# Patient Record
Sex: FEMALE | Race: WHITE | Employment: PART TIME | URBAN - METROPOLITAN AREA
[De-identification: names, ages, dates, MRNs, and addresses within clinical notes are randomized per-mention and may not be internally consistent; named-entity substitution may affect disease eponyms.]

---
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
## 2021-01-02 ENCOUNTER — OFFICE VISIT - RIVER FALLS (OUTPATIENT)
Dept: FAMILY MEDICINE | Facility: CLINIC | Age: 21
End: 2021-01-02

## 2021-01-02 LAB — DEPRECATED S PYO AG THROAT QL EIA: NOT DETECTED

## 2021-01-08 ENCOUNTER — OFFICE VISIT - RIVER FALLS (OUTPATIENT)
Dept: FAMILY MEDICINE | Facility: CLINIC | Age: 21
End: 2021-01-08

## 2021-01-08 ASSESSMENT — MIFFLIN-ST. JEOR: SCORE: 1469.87

## 2021-01-09 LAB
CHLAMYDIA TRACHOMATIS RNA, TMA - QUEST: NOT DETECTED
NEISSERIA GONORRHOEAE RNA TMA: NOT DETECTED

## 2021-01-15 ENCOUNTER — COMMUNICATION - RIVER FALLS (OUTPATIENT)
Dept: FAMILY MEDICINE | Facility: CLINIC | Age: 21
End: 2021-01-15

## 2021-01-27 ENCOUNTER — COMMUNICATION - RIVER FALLS (OUTPATIENT)
Dept: FAMILY MEDICINE | Facility: CLINIC | Age: 21
End: 2021-01-27

## 2021-02-08 ENCOUNTER — COMMUNICATION - RIVER FALLS (OUTPATIENT)
Dept: FAMILY MEDICINE | Facility: CLINIC | Age: 21
End: 2021-02-08

## 2021-02-11 ENCOUNTER — OFFICE VISIT - RIVER FALLS (OUTPATIENT)
Dept: FAMILY MEDICINE | Facility: CLINIC | Age: 21
End: 2021-02-11

## 2021-02-11 ASSESSMENT — MIFFLIN-ST. JEOR: SCORE: 1447.19

## 2021-02-12 ENCOUNTER — COMMUNICATION - RIVER FALLS (OUTPATIENT)
Dept: FAMILY MEDICINE | Facility: CLINIC | Age: 21
End: 2021-02-12

## 2021-02-12 LAB
6-ACETYL MORPHINE - HISTORICAL: ABNORMAL
6-ACETYL MORPHINE - HISTORICAL: NEGATIVE NG/ML
AMPHETAMINE - HISTORICAL: ABNORMAL
AMPHETAMINES UR QL: NEGATIVE NG/ML
BENZODIAZ UR QL SCN: NEGATIVE NG/ML
BENZODIAZEPINES: ABNORMAL
BUPRENORPHINE - HISTORICAL: ABNORMAL
BUPRENORPHINE: NEGATIVE NG/ML
COCAINE METAB URINE - HISTORICAL: NEGATIVE NG/ML
COCAINE METABOLITE: ABNORMAL
COMMENTS: ABNORMAL
CREAT UR-MCNC: 230.5 MG/DL
ETHYL GLUCURONIDE UR QL CFM: ABNORMAL
ETHYL GLUCURONIDE UR QL CFM: NEGATIVE NG/ML
MDMA: ABNORMAL
MDMA: NEGATIVE NG/ML
OPIATES UR QL SCN: NEGATIVE NG/ML
OPIATES: ABNORMAL
OXIDANTS URINE: NEGATIVE MCG/ML
OXYCODONE URINE - HISTORICAL: NEGATIVE NG/ML
OXYCODONE: ABNORMAL
PH UR STRIP: 5.7 [PH] (ref 4.5–9)
PRESCRIBED DRUG 1: ABNORMAL
THC 50 URINE - HISTORICAL: NEGATIVE NG/ML
THC METABOLITE: ABNORMAL

## 2021-08-05 ENCOUNTER — OFFICE VISIT - RIVER FALLS (OUTPATIENT)
Dept: FAMILY MEDICINE | Facility: CLINIC | Age: 21
End: 2021-08-05

## 2021-08-23 ENCOUNTER — VIRTUAL VISIT (OUTPATIENT)
Dept: BEHAVIORAL HEALTH | Facility: TELEHEALTH | Age: 21
End: 2021-08-23
Payer: MEDICAID

## 2021-08-23 DIAGNOSIS — F43.23 ADJUSTMENT DISORDER WITH MIXED ANXIETY AND DEPRESSED MOOD: Primary | ICD-10-CM

## 2021-08-23 PROCEDURE — 90832 PSYTX W PT 30 MINUTES: CPT | Mod: 95 | Performed by: SOCIAL WORKER

## 2021-08-26 NOTE — PROGRESS NOTES
[facility] Primary [facility]: : Integrated Behavioral Health  August 23, 2021    Telemedicine Visit: The patient's condition can be safely assessed and treated via synchronous audio and visual telemedicine encounter.      Reason for Telemedicine Visit: Services only offered telehealth    Originating Site (Patient Location): Patient's home    Distant Site (Provider Location): Provider Remote Setting- Home Office    Consent:  The patient/guardian has verbally consented to: the potential risks and benefits of telemedicine (video visit) versus in person care; bill my insurance or make self-payment for services provided; and responsibility for payment of non-covered services.     Mode of Communication:  Video Conference via Visual Networks    As the provider I attest to compliance with applicable laws and regulations related to telemedicine.      Behavioral Health Clinician Progress Note    Patient Name: Charlotte Russell           Service Type:  Individual      Service Location:   Face to Face in Home / Community via KSKT     Session Start Time: 12:35pm  Session End Time: 12:58pm      Session Length: 16 - 37      Attendees: Patient    Visit Activities (Refresh list every visit): [facility] Only    Diagnostic Assessment Date: Next Session  Treatment Plan Review Date: NA  See Flowsheets for today's PHQ-9 and AI-7 results  Previous PHQ-9: No flowsheet data found.  Previous AI-7: No flowsheet data found.    YARITZA LEVEL:  No flowsheet data found.    DATA  Extended Session (60+ minutes): No  Interactive Complexity: No  Crisis: No  [facility] Patient: No    Treatment Objective(s) Addressed in This Session:  Target Behavior(s): disease management/lifestyle changes related to mental health    Depressed Mood: Increase interest, engagement, and pleasure in doing things  Decrease frequency and intensity of feeling down, depressed, hopeless  Improve quantity and quality of night time sleep / decrease daytime naps  Feel  less tired and more energy during the day   Improve diet, appetite, mindful eating, and / or meal planning  Identify negative self-talk and behaviors: challenge core beliefs, myths, and actions  Improve concentration, focus, and mindfulness in daily activities   Feel less fidgety, restless or slow in daily activities / interpersonal interactions  Anxiety: will experience a reduction in anxiety, will develop more effective coping skills to manage anxiety symptoms, will develop healthy cognitive patterns and beliefs and will increase ability to function adaptively  PTSD Symptom Management    Current Stressors / Issues:  [facility] [place] introduced self and role to patient. Patient reported that she has done counseling before and done testing. Patient reported that she has been diagnosed with PTSD before, but has been struggling recently with managing stress. Patient reported that she is struggling with sleep, having a lack of energy, and irritability. She reported that her PTSD symptoms have not been too bad and that she feels like it has been managed well. One of the challenges for patient is that she does not have family support to help manage the stressors in her life. Patient denied SI or thoughts of self-harm.      Progress on Treatment Objective(s) / Homework:  New Objective established this session - CONTEMPLATION (Considering change and yet undecided); Intervened by assessing the negative and positive thinking (ambivalence) about behavior change    Motivational Interviewing    MI Intervention: Expressed Empathy/Understanding, Supported Autonomy, Collaboration, Evocation, Permission to raise concern or advise, Open-ended questions and Reflections: simple and complex     Change Talk Expressed by the Patient: Desire to change Ability to change Reasons to change Need to change    Provider Response to Change Talk: E - Evoked more info from patient about behavior change, A - Affirmed patient's thoughts, decisions, or attempts at  behavior change, R - Reflected patient's change talk and S - Summarized patient's change talk statements      Situation        Automatic Thoughts  Cognitive Distortions      Feelings        Behavior        Questioning Thoughts            Also provided psychoeducation about behavioral health condition, symptoms, and treatment options    Care Plan review completed: Yes    Medication Review:  No current psychiatric medications prescribed    Medication Compliance:  NA    Changes in Health Issues:   None reported    Chemical Use Review:   Substance Use: Chemical use reviewed, no active concerns identified      Tobacco Use: No current tobacco use.      Assessment: Current Emotional / Mental Status (status of significant symptoms):  Risk status (Self / Other harm or suicidal ideation)  Patient denies a history of suicidal ideation, suicide attempts, self-injurious behavior, homicidal ideation, homicidal behavior and and other safety concerns  Patient denies current fears or concerns for personal safety.  Patient denies current or recent suicidal ideation or behaviors.  Patient denies current or recent homicidal ideation or behaviors.  Patient denies current or recent self injurious behavior or ideation.  Patient denies other safety concerns.  A safety and risk management plan has not been developed at this time, however patient was encouraged to call [place] [phone] should there be a change in any of these risk factors.    Appearance:   Appropriate   Eye Contact:   Good   Psychomotor Behavior: Normal   Attitude:   Cooperative   Orientation:   All  Speech   Rate / Production: Normal    Volume:  Normal   Mood:    Normal  Affect:    Appropriate   Thought Content:  Clear   Thought Form:  Coherent  Logical   Insight:    Good     Diagnoses:  1. Adjustment disorder with mixed anxiety and depressed mood        Collateral Reports Completed:  Routed note to PCP    Plan: (Homework, other):  Patient was given information about  behavioral services and encouraged to schedule a follow up appointment with the clinic [facility] as needed.  She was also given information about mental health symptoms and treatment options .  CD Recommendations: No indications of CD issues. DA to be completed at next session. DWAYNE Bosch, [facility]      ______________________________________________________________________

## 2021-10-05 ENCOUNTER — OFFICE VISIT - RIVER FALLS (OUTPATIENT)
Dept: FAMILY MEDICINE | Facility: CLINIC | Age: 21
End: 2021-10-05

## 2021-10-07 LAB — BACTERIA SPEC CULT: NORMAL

## 2021-10-16 ENCOUNTER — HEALTH MAINTENANCE LETTER (OUTPATIENT)
Age: 21
End: 2021-10-16

## 2021-10-22 LAB — DEPRECATED S PYO AG THROAT QL EIA: NEGATIVE

## 2021-11-15 ENCOUNTER — OFFICE VISIT - RIVER FALLS (OUTPATIENT)
Dept: FAMILY MEDICINE | Facility: CLINIC | Age: 21
End: 2021-11-15

## 2021-11-15 PROCEDURE — U0005 INFEC AGEN DETEC AMPLI PROBE: HCPCS | Mod: ORL | Performed by: FAMILY MEDICINE

## 2021-11-15 ASSESSMENT — MIFFLIN-ST. JEOR: SCORE: 1453.09

## 2021-11-16 ENCOUNTER — LAB REQUISITION (OUTPATIENT)
Dept: LAB | Facility: CLINIC | Age: 21
End: 2021-11-16
Payer: MEDICAID

## 2021-11-16 DIAGNOSIS — U07.1 COVID-19: ICD-10-CM

## 2021-11-17 ENCOUNTER — COMMUNICATION - RIVER FALLS (OUTPATIENT)
Dept: FAMILY MEDICINE | Facility: CLINIC | Age: 21
End: 2021-11-17

## 2021-11-17 LAB
BACTERIA SPEC CULT: NORMAL
SARS-COV-2 RNA RESP QL NAA+PROBE: NORMAL
SARS-COV-2 RNA RESP QL NAA+PROBE: NOT DETECTED

## 2021-11-18 LAB — SARS-COV-2 RNA RESP QL NAA+PROBE: NEGATIVE

## 2021-12-06 ENCOUNTER — OFFICE VISIT - RIVER FALLS (OUTPATIENT)
Dept: FAMILY MEDICINE | Facility: CLINIC | Age: 21
End: 2021-12-06

## 2021-12-06 ASSESSMENT — MIFFLIN-ST. JEOR: SCORE: 1471.68

## 2021-12-07 LAB
CHLAMYDIA TRACHOMATIS RNA, TMA - QUEST: NOT DETECTED
HCV AB SERPL QL IA: NORMAL
HEP C SIGNAL TO CUTOFF(HISTORICAL): 0.02
HIV AG/AB  - NOTE: NORMAL
NEISSERIA GONORRHOEAE RNA TMA: NOT DETECTED
RPR: NORMAL

## 2021-12-08 ENCOUNTER — COMMUNICATION - RIVER FALLS (OUTPATIENT)
Dept: FAMILY MEDICINE | Facility: CLINIC | Age: 21
End: 2021-12-08

## 2022-01-08 LAB — DEPRECATED S PYO AG THROAT QL EIA: NEGATIVE

## 2022-01-25 ENCOUNTER — OFFICE VISIT - RIVER FALLS (OUTPATIENT)
Dept: FAMILY MEDICINE | Facility: CLINIC | Age: 22
End: 2022-01-25

## 2022-02-11 VITALS
SYSTOLIC BLOOD PRESSURE: 106 MMHG | TEMPERATURE: 98.9 F | OXYGEN SATURATION: 99 % | HEART RATE: 74 BPM | WEIGHT: 135 LBS | DIASTOLIC BLOOD PRESSURE: 70 MMHG

## 2022-02-11 VITALS
TEMPERATURE: 99.3 F | SYSTOLIC BLOOD PRESSURE: 110 MMHG | HEART RATE: 84 BPM | DIASTOLIC BLOOD PRESSURE: 78 MMHG | OXYGEN SATURATION: 99 % | WEIGHT: 163.4 LBS

## 2022-02-12 VITALS
HEIGHT: 64 IN | HEART RATE: 64 BPM | SYSTOLIC BLOOD PRESSURE: 114 MMHG | TEMPERATURE: 97.7 F | WEIGHT: 167 LBS | DIASTOLIC BLOOD PRESSURE: 70 MMHG | DIASTOLIC BLOOD PRESSURE: 64 MMHG | BODY MASS INDEX: 28.51 KG/M2 | RESPIRATION RATE: 16 BRPM | SYSTOLIC BLOOD PRESSURE: 116 MMHG | WEIGHT: 167 LBS | HEIGHT: 64 IN | HEART RATE: 61 BPM | BODY MASS INDEX: 28.51 KG/M2

## 2022-02-12 VITALS
HEART RATE: 73 BPM | DIASTOLIC BLOOD PRESSURE: 80 MMHG | BODY MASS INDEX: 26.71 KG/M2 | TEMPERATURE: 97.8 F | WEIGHT: 148.4 LBS | SYSTOLIC BLOOD PRESSURE: 120 MMHG | WEIGHT: 156.6 LBS | HEART RATE: 88 BPM | BODY MASS INDEX: 26.88 KG/M2 | DIASTOLIC BLOOD PRESSURE: 76 MMHG | DIASTOLIC BLOOD PRESSURE: 70 MMHG | OXYGEN SATURATION: 98 % | SYSTOLIC BLOOD PRESSURE: 118 MMHG | WEIGHT: 155.6 LBS | TEMPERATURE: 98.1 F | WEIGHT: 152 LBS | TEMPERATURE: 97.6 F | HEART RATE: 88 BPM | SYSTOLIC BLOOD PRESSURE: 106 MMHG | RESPIRATION RATE: 16 BRPM | SYSTOLIC BLOOD PRESSURE: 118 MMHG | HEART RATE: 82 BPM | OXYGEN SATURATION: 99 % | BODY MASS INDEX: 25.95 KG/M2 | HEIGHT: 64 IN | BODY MASS INDEX: 25.47 KG/M2 | TEMPERATURE: 97.8 F | DIASTOLIC BLOOD PRESSURE: 76 MMHG | OXYGEN SATURATION: 99 %

## 2022-02-12 VITALS
SYSTOLIC BLOOD PRESSURE: 128 MMHG | OXYGEN SATURATION: 99 % | TEMPERATURE: 97.9 F | HEART RATE: 76 BPM | SYSTOLIC BLOOD PRESSURE: 110 MMHG | DIASTOLIC BLOOD PRESSURE: 62 MMHG | TEMPERATURE: 97.9 F | BODY MASS INDEX: 26.98 KG/M2 | HEIGHT: 64 IN | HEART RATE: 60 BPM | WEIGHT: 158 LBS | BODY MASS INDEX: 26.27 KG/M2 | WEIGHT: 153.9 LBS | DIASTOLIC BLOOD PRESSURE: 70 MMHG | HEIGHT: 64 IN

## 2022-02-12 VITALS
DIASTOLIC BLOOD PRESSURE: 72 MMHG | WEIGHT: 157.6 LBS | SYSTOLIC BLOOD PRESSURE: 120 MMHG | HEIGHT: 64 IN | TEMPERATURE: 97.8 F | OXYGEN SATURATION: 98 % | BODY MASS INDEX: 26.91 KG/M2 | DIASTOLIC BLOOD PRESSURE: 79 MMHG | HEART RATE: 102 BPM | DIASTOLIC BLOOD PRESSURE: 76 MMHG | BODY MASS INDEX: 26.05 KG/M2 | TEMPERATURE: 98.3 F | SYSTOLIC BLOOD PRESSURE: 120 MMHG | TEMPERATURE: 97.4 F | HEART RATE: 88 BPM | WEIGHT: 152.6 LBS | SYSTOLIC BLOOD PRESSURE: 122 MMHG | OXYGEN SATURATION: 98 % | HEART RATE: 97 BPM | HEIGHT: 64 IN

## 2022-02-12 VITALS
HEART RATE: 71 BPM | OXYGEN SATURATION: 98 % | DIASTOLIC BLOOD PRESSURE: 80 MMHG | RESPIRATION RATE: 16 BRPM | SYSTOLIC BLOOD PRESSURE: 100 MMHG | WEIGHT: 162 LBS | BODY MASS INDEX: 27.81 KG/M2

## 2022-02-12 VITALS
WEIGHT: 152 LBS | HEIGHT: 64 IN | HEART RATE: 64 BPM | RESPIRATION RATE: 16 BRPM | BODY MASS INDEX: 25.95 KG/M2 | SYSTOLIC BLOOD PRESSURE: 114 MMHG | DIASTOLIC BLOOD PRESSURE: 70 MMHG | TEMPERATURE: 98.9 F

## 2022-02-12 VITALS
SYSTOLIC BLOOD PRESSURE: 127 MMHG | HEART RATE: 88 BPM | HEIGHT: 64 IN | HEART RATE: 84 BPM | SYSTOLIC BLOOD PRESSURE: 108 MMHG | DIASTOLIC BLOOD PRESSURE: 84 MMHG | OXYGEN SATURATION: 99 % | SYSTOLIC BLOOD PRESSURE: 106 MMHG | BODY MASS INDEX: 27.19 KG/M2 | HEART RATE: 103 BPM | OXYGEN SATURATION: 98 % | WEIGHT: 150 LBS | DIASTOLIC BLOOD PRESSURE: 72 MMHG | HEART RATE: 88 BPM | WEIGHT: 158.4 LBS | DIASTOLIC BLOOD PRESSURE: 73 MMHG | HEART RATE: 72 BPM | BODY MASS INDEX: 26.16 KG/M2 | BODY MASS INDEX: 25.44 KG/M2 | TEMPERATURE: 98.1 F | TEMPERATURE: 98 F | WEIGHT: 161.4 LBS | BODY MASS INDEX: 25.75 KG/M2 | DIASTOLIC BLOOD PRESSURE: 70 MMHG | OXYGEN SATURATION: 98 % | BODY MASS INDEX: 27.55 KG/M2 | BODY MASS INDEX: 27.33 KG/M2 | TEMPERATURE: 97.4 F | WEIGHT: 152.4 LBS | SYSTOLIC BLOOD PRESSURE: 110 MMHG | DIASTOLIC BLOOD PRESSURE: 68 MMHG | HEART RATE: 65 BPM | OXYGEN SATURATION: 99 % | SYSTOLIC BLOOD PRESSURE: 128 MMHG | TEMPERATURE: 97 F | WEIGHT: 159.2 LBS | DIASTOLIC BLOOD PRESSURE: 78 MMHG | SYSTOLIC BLOOD PRESSURE: 112 MMHG | TEMPERATURE: 98.5 F | WEIGHT: 148.2 LBS

## 2022-02-12 VITALS
TEMPERATURE: 98.5 F | DIASTOLIC BLOOD PRESSURE: 77 MMHG | WEIGHT: 168 LBS | SYSTOLIC BLOOD PRESSURE: 120 MMHG | HEART RATE: 82 BPM

## 2022-02-12 VITALS
SYSTOLIC BLOOD PRESSURE: 112 MMHG | BODY MASS INDEX: 24.99 KG/M2 | HEART RATE: 76 BPM | DIASTOLIC BLOOD PRESSURE: 66 MMHG | TEMPERATURE: 97.7 F | HEART RATE: 92 BPM | WEIGHT: 145.6 LBS | SYSTOLIC BLOOD PRESSURE: 118 MMHG | WEIGHT: 141.6 LBS | DIASTOLIC BLOOD PRESSURE: 78 MMHG | BODY MASS INDEX: 24.31 KG/M2 | TEMPERATURE: 96.4 F | OXYGEN SATURATION: 98 % | OXYGEN SATURATION: 99 %

## 2022-02-12 VITALS
DIASTOLIC BLOOD PRESSURE: 72 MMHG | WEIGHT: 137 LBS | DIASTOLIC BLOOD PRESSURE: 70 MMHG | WEIGHT: 137.4 LBS | WEIGHT: 140.6 LBS | HEART RATE: 103 BPM | DIASTOLIC BLOOD PRESSURE: 64 MMHG | BODY MASS INDEX: 23.58 KG/M2 | SYSTOLIC BLOOD PRESSURE: 114 MMHG | HEART RATE: 84 BPM | BODY MASS INDEX: 24.01 KG/M2 | HEIGHT: 64 IN | SYSTOLIC BLOOD PRESSURE: 120 MMHG | HEART RATE: 76 BPM | TEMPERATURE: 97.9 F | TEMPERATURE: 98.5 F | OXYGEN SATURATION: 98 % | BODY MASS INDEX: 23.52 KG/M2 | SYSTOLIC BLOOD PRESSURE: 112 MMHG | TEMPERATURE: 97.5 F

## 2022-02-12 VITALS
HEIGHT: 64 IN | DIASTOLIC BLOOD PRESSURE: 80 MMHG | BODY MASS INDEX: 27.76 KG/M2 | TEMPERATURE: 98.7 F | WEIGHT: 162.6 LBS | SYSTOLIC BLOOD PRESSURE: 118 MMHG | HEART RATE: 92 BPM

## 2022-02-12 VITALS
TEMPERATURE: 98.5 F | DIASTOLIC BLOOD PRESSURE: 79 MMHG | HEART RATE: 91 BPM | SYSTOLIC BLOOD PRESSURE: 119 MMHG | BODY MASS INDEX: 26.61 KG/M2 | WEIGHT: 155 LBS

## 2022-02-12 VITALS
DIASTOLIC BLOOD PRESSURE: 70 MMHG | HEART RATE: 88 BPM | SYSTOLIC BLOOD PRESSURE: 118 MMHG | WEIGHT: 137 LBS | BODY MASS INDEX: 23.89 KG/M2 | TEMPERATURE: 97.4 F

## 2022-02-15 NOTE — TELEPHONE ENCOUNTER
---------------------  From: Jazmin Vargas MD   To: SENG SCHRADER    Sent: 1/15/2021 1:02:30 PM CST  Subject: General Message       this is normal!    Results:  Date Result Name Value Ref Range   1/8/2021 10:12 AM Chlam/N. gonorrhea Comments See comment    1/8/2021 10:12 AM Chlamydia RNA NOT DETECTED (NOT DETECTED - )   1/8/2021 10:12 AM Neisseria gonorrhoeae RNA NOT DETECTED (NOT DETECTED - )

## 2022-02-15 NOTE — PROGRESS NOTES
Patient:   SENG SCHRADER            MRN: [id number]            FIN: [id number]               Age:   19 years     Sex:  Female     :  2000   Associated Diagnoses:   Cystitis; Possible urinary tract infection   Author:   Fina Verma MD      Chief Complaint   8/3/2019 12:22 PM CDT    Patient is here for UTI. c/o burning with urination, back pain. Started about a week ago.        History of Present Illness   patient with one week of UTI symptoms - urgency, frequency, dysuria  feeling flu like  new onset of bilateral flank pain since last night, uncomfortable, kept her up at night  no known fevers  no blood in urine         Health Status   Allergies:    Allergic Reactions (All)  No known allergies  No Known Medication Allergies   Medications:  (Selected)   Prescriptions  Prescribed  Adderall XR 25 mg oral capsule, extended release: = 1 cap(s) ( 25 mg ), PO, qAM, # 30 cap(s), 0 Refill(s), Type: Maintenance  Adderall XR 25 mg oral capsule, extended release: = 1 cap(s) ( 25 mg ), PO, qAM, # 30 cap(s), 0 Refill(s), Type: Maintenance, Pharmacy: [id number], 1 cap(s) Oral qam  Adderall XR 25 mg oral capsule, extended release: = 1 cap(s) ( 25 mg ), PO, qAM, # 30 cap(s), 0 Refill(s), Type: Maintenance, Pharmacy: [id number], 1 cap(s) Oral qam  Loestrin 21  oral tablet: 1 tab(s), PO, Daily, # 84 tab(s), 3 Refill(s), Type: Maintenance, Pharmacy: [id number], 1 tab(s) Oral daily   Problem list:    All Problems (Selected)  ADD (attention deficit disorder) / SNOMED CT 5875085642 / Confirmed  Concussion / SNOMED CT 1074883378 / Confirmed  Needle phobia / SNOMED CT 909242236 / Confirmed  Syncope / SNOMED CT 199500942 / Confirmed      Histories   Past Medical History:    Active  ADD (attention deficit disorder) (SNOMED CT 2620531470)  Comments:  2011 CDT 11:51 AM CDT - Lorena Hood  'Possible'  Resolved  Viral pharyngitis (SNOMED CT 5103286):  Resolved.   Family History:     Gastro-esophageal reflux disease  Mother ()  Suicide..  Mother ()     Procedure history:    No previous procedures.      Physical Examination   Vital Signs   8/3/2019 12:22 PM CDT Temperature Tympanic 97.4 DegF  LOW    Peripheral Pulse Rate 88 bpm    HR Method Electronic    Systolic Blood Pressure 118 mmHg    Diastolic Blood Pressure 70 mmHg    Mean Arterial Pressure 86 mmHg    BP Site Right arm    BP Method Manual      Measurements from flowsheet : Measurements   8/3/2019 12:22 PM CDT    Weight Measured - Standard                137 lb     General:  Alert and oriented, No acute distress.    Eye:  Normal conjunctiva.    HENT:  Oral mucosa is moist.    Respiratory:  Lungs are clear to auscultation, Respirations are non-labored.    Cardiovascular:  Normal rate, Regular rhythm.    Genitourinary:  right CVA tenderness.       Review / Management   Results review:  Lab results   8/3/2019 12:10 PM CDT UA WBC Clumps Present    UA Epithelial Cells Moderate    UA WBC     UA RBC 11-25    UA Bacteria Many   8/3/2019 11:57 AM CDT UA pH 7.0    UA Specific Gravity 1.015    UA Glucose NEGATIVE    UA Bilirubin NEGATIVE    UA Ketones NEGATIVE    Urine Occult Blood 3+    UA Protein 2+    UA Nitrite NEGATIVE    UA Leukocyte Esterase 2+   .       Impression and Plan   Diagnosis     Cystitis (PQJ74-OB N30.90).     Possible urinary tract infection (LDZ10-MZ R39.89).     Plan:  UTI symptoms, concerned for early pyelo, patient declines urine culture testing due to concerns about cost but will  antibiotics today and understands that if she is worsening, or if she isn't improving over the next 48-72 hours, she needs to follow up in clinic for further testing..    Orders     Orders (Selected)   Prescriptions  Prescribed  Cipro 500 mg oral tablet: = 1 tab(s) ( 500 mg ), Oral, q12 hrs, x 10 day(s), # 20 tab(s), 0 Refill(s), Type: Acute, Pharmacy: [id number], 1 tab(s) Oral q12 hrs,x10 day(s).

## 2022-02-15 NOTE — NURSING NOTE
Comprehensive Intake Entered On:  1/8/2021 9:12 AM CST    Performed On:  1/8/2021 9:05 AM CST by Chloé Zapata CMA               Summary   Chief Complaint :   Medication check, and requesting referral to GYN   Last Menstrual Period :   12/13/2020 CST   Menstrual Status :   Menarcheal   Weight Measured :   157.6 lb(Converted to: 157 lb 10 oz, 71.486 kg)    Height Measured :   64 in(Converted to: 5 ft 4 in, 162.56 cm)    Body Mass Index :   27.05 kg/m2   Body Surface Area :   1.79 m2   Height/Length Estimated :   64 in(Converted to: 5 ft 4 in, 162.56 cm)    Systolic Blood Pressure :   120 mmHg   Diastolic Blood Pressure :   72 mmHg   Mean Arterial Pressure :   88 mmHg   Peripheral Pulse Rate :   88 bpm   BP Site :   Right arm   Pulse Site :   Radial artery   BP Method :   Manual   HR Method :   Manual   Temperature Tympanic :   97.8 DegF(Converted to: 36.6 DegC)  (LOW)    Chloé Zapata CMA - 1/8/2021 9:05 AM CST   Health Status   Allergies Verified? :   Yes   Medication History Verified? :   Yes   Medical History Verified? :   No   Pre-Visit Planning Status :   Completed   Tobacco Use? :   Never smoker   Chloé Zapata CMA - 1/8/2021 9:05 AM CST   Consents   Consent for Immunization Exchange :   Consent Granted   Consent for Immunizations to Providers :   Consent Granted   Chloé Zapata CMA - 1/8/2021 9:05 AM CST   Meds / Allergies   (As Of: 1/8/2021 9:12:25 AM CST)   Allergies (Active)   Vyvanse  Estimated Onset Date:   Unspecified ; Reactions:   Irritability ; Created By:   Jazmin Vargas MD; Reaction Status:   Active ; Category:   Drug ; Substance:   Vyvanse ; Type:   Side Effect ; Severity:   Moderate ; Updated By:   Jazmin Vargas MD; Reviewed Date:   1/8/2021 9:08 AM CST        Medication List   (As Of: 1/8/2021 9:12:25 AM CST)   Prescription/Discharge Order    ethinyl estradiol-norethindrone  :   ethinyl estradiol-norethindrone ; Status:   Prescribed ; Ordered As Mnemonic:   ethinyl  estradiol-norethindrone 20 mcg-1 mg oral tablet ; Simple Display Line:   1 tab(s), Oral, daily, 84 tab(s), 0 Refill(s) ; Ordering Provider:   Jazmin Vargas MD; Catalog Code:   ethinyl estradiol-norethindrone ; Order Dt/Tm:   9/21/2020 10:57:52 AM CDT          methylphenidate  :   methylphenidate ; Status:   Prescribed ; Ordered As Mnemonic:   methylphenidate 10 mg oral tablet ; Simple Display Line:   15 mg, 1.5 tab(s), Oral, bid, 90 tab(s), 0 Refill(s) ; Ordering Provider:   Jazmin Vargas MD; Catalog Code:   methylphenidate ; Order Dt/Tm:   9/11/2020 6:19:54 PM CDT          methylphenidate  :   methylphenidate ; Status:   Completed ; Ordered As Mnemonic:   methylphenidate 10 mg oral tablet ; Simple Display Line:   15 mg, 1.5 tab(s), Oral, bid, 90 tab(s), 0 Refill(s) ; Ordering Provider:   Jazmin Vargas MD; Catalog Code:   methylphenidate ; Order Dt/Tm:   3/24/2020 5:30:34 PM CDT            ID Risk Screen   Recent Travel History :   No recent travel   Family Member Travel History :   No recent travel   Other Exposure to Infectious Disease :   Unknown   Chloé Zapata CMA - 1/8/2021 9:05 AM CST   Social History   Social History   (As Of: 1/8/2021 9:12:26 AM CST)   Alcohol:        Never   (Last Updated: 9/5/2018 8:33:10 AM CDT by Nadiya Sinclair)          Tobacco:        Never, Household tobacco concerns: No.   (Last Updated: 5/13/2011 11:48:42 AM CDT by Lorena Hood)   Never (less than 100 in lifetime)   (Last Updated: 12/15/2020 3:18:12 PM CST by Joaquina Gallego CMA)          Electronic Cigarette/Vaping:        Electronic Cigarette Use: Never.   (Last Updated: 12/15/2020 3:18:12 PM CST by Joaquina Gallego CMA)          Substance Abuse:        Never   (Last Updated: 8/11/2015 2:20:36 PM CDT by Henry Florian CMA)          Home/Environment:        Lives with Father, Stepmother.  Substance abuse in household: No.  Smoker in household: No.  Risks in environment: Does not wear helmet.   (Last Updated:  8/11/2015 2:25:12 PM CDT by Henry Florian CMA)          Sexual:        Sexually active: No.   (Last Updated: 8/11/2015 2:21:05 PM CDT by Henry Florian CMA)

## 2022-02-15 NOTE — PROGRESS NOTES
Patient:   SENG SCHRADER            MRN: [id number]            FIN: [id number]               Age:   17 years     Sex:  Female     :  2000   Associated Diagnoses:   Infected pierced ear   Author:   Steven Katz PA-C      Chief Complaint   2018 3:18 PM CDT    Pt here for infected Right ear piercing.  Pt states right ear lob is swollen and painful to touch x 1 day      History of Present Illness   Chief complaint and symptoms noted above and confirmed with patient   as above  she has had her ears pierced for about 8 years, not wearing new earrings  but yesterday right ear lobe became swollen and painful, no drainage  hard to pull earing out last night      Review of Systems   Constitutional:  Negative.    Ear/Nose/Mouth/Throat:  No sore throat     Ear pain: Right.    Respiratory:  Negative.       Health Status   Allergies:    Allergic Reactions (Selected)  No known allergies   Medications:  (Selected)   Prescriptions  Prescribed  Loestrin 21  oral tablet: 1 tab(s), PO, Daily, # 84 tab(s), 3 Refill(s), Type: Maintenance, Pharmacy: [id number], 1 tab(s) po daily  erythromycin 2% topical gel: See Instructions, Instructions: APPLY TWICE DAILY, # 30 gm, 1 Refill(s), Pharmacy: [id number], APPLY TWICE DAILY   Problem list:    All Problems (Selected)  ADD (Attention Deficit Disorder) / ICD-9-.00 / Confirmed      Histories   Past Medical History:    Active  ADD (Attention Deficit Disorder) (314.00)  Comments:  2011 CDT 11:51 AM CDT - Lorena Hood  'Possible'  Resolved  Viral pharyngitis (462):  Resolved.   Family History:    Gastro-esophageal reflux disease  Mother ()  Suicide..  Mother ()     Procedure history:    No previous procedures.      Physical Examination   Vital Signs   2018 3:18 PM CDT Temperature Tympanic 98.9 DegF    Peripheral Pulse Rate 64 bpm    Pulse Site Radial artery    Respiratory Rate 16 br/min    Systolic Blood Pressure 114  mmHg    Diastolic Blood Pressure 70 mmHg    Mean Arterial Pressure 85 mmHg    BP Site Right arm      Measurements from flowsheet : Measurements   5/31/2018 3:18 PM CDT Height Measured - Standard 63.5 in    Weight Measured - Standard 152 lb    BSA 1.76 m2    Body Mass Index 26.5 kg/m2    Body Mass Index Percentile 89.54      General:  No acute distress.    HENT:  Tympanic membranes are clear, No pharyngeal erythema, No sinus tenderness, right lower earlobe is inflamed and swollen, slight crusting over lower piercing, no drainage.    Neck:  Supple, Non-tender, No lymphadenopathy.       Impression and Plan   Diagnosis     Infected pierced ear (PSJ42-QX S01.339A).     Summary:  will treat with Bactrim DS for 7 days, continue to wear earrings, apply neosporin to earring and work it through the piercing, follow up if not improving  I called her father and discussed treatment plan with him.    Orders     Orders   Pharmacy:  Bactrim  mg-160 mg oral tablet (Prescribe): 1 tab(s), PO, BID, x 7 day(s), # 14 tab(s), 0 Refill(s), Type: Maintenance, Pharmacy: [id number], 1 tab(s) po bid,x7 day(s).     Orders   Charges (Evaluation and Management):  14359 office outpatient visit 15 minutes (Charge) (Order): Quantity: 1, Infected pierced ear.     Orders   Charges (Evaluation and Management):  79915 office outpatient visit 15 minutes (Charge) (Order): Quantity: 1, Infected pierced ear.

## 2022-02-15 NOTE — LETTER
(Inserted Image. Unable to display)       May 18, 2019      SENG MACRINA  [id number], [place] [id number]        Dear SENG,     Thank you for selecting [facility] for your healthcare needs. Below you will find the results of your recent test(s) done at our clinic.      Your results are normal!      Result Name Current Result Reference Range   U pH  6.8 5/15/2019 4.5 - 9.0   U Creatinine (mg/dL)  131.4 5/15/2019 > or = 20.0 -    Pain Management Prescribed Drug 1  Adderall(TM) 5/15/2019    U Alcohol Metab medMATCH  CONSISTENT 5/15/2019    U Alcohol Metabolities (ng/mL)  NEGATIVE 5/15/2019  - <500   U Amphetamines (ng/mL)  NEGATIVE 5/15/2019  - <500   U Amphetamines medMATCH ((A)) INCONSISTENT 5/15/2019    U Benzodia Scrn (ng/mL)  NEGATIVE 5/15/2019  - <100   U Benzodia medMATCH  CONSISTENT 5/15/2019    U Buprenorphine (ng/mL)  NEGATIVE 5/15/2019  - <5   U Cannabis Metabolite medMATCH  CONSISTENT 5/15/2019    U Cannabis Metabolite Scrn (ng/mL)  NEGATIVE 5/15/2019  - <20   U Cocaine Metabolite Scrn (ng/mL)  NEGATIVE 5/15/2019  - <150   U Cocaine Metabolite medMATCH  CONSISTENT 5/15/2019    U MDMA (ng/mL)  NEGATIVE 5/15/2019  - <500   U medMATCH Comments  See comment 5/15/2019    U 6-Acetylmorphine (ng/mL)  NEGATIVE 5/15/2019  - <10   U 6 Acetylmorphine medMATCH  CONSISTENT 5/15/2019    U Opiates Scrn (ng/mL)  NEGATIVE 5/15/2019  - <100   U Opiates medMATCH  CONSISTENT 5/15/2019    U Oxidants (mcg/mL)  NEGATIVE 5/15/2019  - <200   U Oxycodone Scrn (ng/mL)  NEGATIVE 5/15/2019  - <100   U Oxycodone Scrn medMATCH  CONSISTENT 5/15/2019    U Buprenorphine medMATCH  CONSISTENT 5/15/2019    U MDMA medMATCH  CONSISTENT 5/15/2019      Please contact my practice at [phone] if you have any questions or concerns.     Sincerely,        Jazmin Vargas MD      What do your labs mean?  Below is a glossary of commonly ordered labs:  LDL   Bad Cholesterol   HDL   Good Cholesterol  AST/ALT   Liver  Function   Cr/Creatinine   Kidney Function  Microalbumin   Kidney Function  BUN   Kidney Function  PSA   Prostate    TSH   Thyroid Hormone  HgbA1c   Diabetes Test   Hgb (Hemoglobin)   Red Blood Cells

## 2022-02-15 NOTE — TELEPHONE ENCOUNTER
Entered by Delgado Ng LPN on February 08, 2021 9:32:22 AM CST  Controlled Substance Agreement Signed 09-13-19  Previous med was Adderall XR 25mg 1 CAPSULE DAILY, last prescribed on 1-8-21.  Routing to provider for review.    ---------------------  From: SENG SCHRADER  To: [facility]  Sent: 02/07/2021 10:30 p.m. CST  Subject: Back to the medication before vyvanse  I am looking to be prescribed the medication I was taking before my latest vyvanse. I had an absolute horrible week last week on vyvanse and actually caused my PTSD to spike pretty badly---------------------  From: Delgado Ng LPN (Phone Messages Pool ([id number])   To: Jazmin Vargas MD;     Sent: 2/8/2021 9:33:25 AM CST  Subject: FW: Back to the medication before vyvanse---------------------  From: Jazmin Vargas MD   To: Phone [employer] ([id number]);     Sent: 2/9/2021 12:58:02 PM CST  Subject: RE: Back to the medication before vyvanse     please invite pt to schedule a telemed appt for us to follow up her adhd

## 2022-02-15 NOTE — TELEPHONE ENCOUNTER
---------------------  From: Jazmin Vargas MD   To: [facility] Message Pool ([id number]);     Sent: 1/8/2020 12:36:26 PM CST  Subject: General Message     I resent it with a different diagnosis code.  can you check with the pharmacy to see if this will cover it?will wait to hear from pharmacy if covered.---------------------  From: Zulma Owens ([facility] Message Pool ([id number]_[facility]))   To: [facility] Message Pool ([id number]);     Sent: 1/8/2020 2:05:18 PM CST  Subject: FW: General Message     pharmacy called again stating that it is not covered with that dx code. Last time it was covered it was because it was sent under F900- ADHD. F900 is the dx code that insurance will cover. Please send Rx with dx code that is covered.---------------------  From: Alice Lucas CMA ([facility] Message Pool ([id number]_[facility]))   To: Jazmin Vargas MD;     Sent: 1/8/2020 2:09:05 PM CST  Subject: FW: General Message---------------------  From: Jazmin Vargas MD   To: [facility] Message Pool ([id number]);     Sent: 1/8/2020 2:42:47 PM CST  Subject: RE: General Message     ok, I think I figured out what was happening.  please call the pharmacy to cancel all of the prescription sent over except for 1 prescription for concerta 36 mg sent today with F90.0 attached to it, thanks

## 2022-02-15 NOTE — NURSING NOTE
Comprehensive Intake Entered On:  10/9/2019 11:53 AM CDT    Performed On:  10/9/2019 11:49 AM CDT by Sherita Fernandez CMA               Summary   Chief Complaint :   LBP, dysuria, frequency x 1 week.    Menstrual Status :   Menarcheal   Weight Measured :   137 lb(Converted to: 137 lb 0 oz, 62.14 kg)    Systolic Blood Pressure :   112 mmHg   Diastolic Blood Pressure :   64 mmHg   Mean Arterial Pressure :   80 mmHg   Peripheral Pulse Rate :   76 bpm   BP Site :   Right arm   Pulse Site :   Radial artery   BP Method :   Manual   HR Method :   Manual   Temperature Tympanic :   97.5 DegF(Converted to: 36.4 DegC)  (LOW)    Sherita Fernandez CMA - 10/9/2019 11:49 AM CDT   Health Status   Allergies Verified? :   Yes   Medication History Verified? :   Yes   Pre-Visit Planning Status :   Not completed   Sherita Fernandez CMA - 10/9/2019 11:49 AM CDT   Meds / Allergies   (As Of: 10/9/2019 11:53:14 AM CDT)   Allergies (Active)   No Known Medication Allergies  Estimated Onset Date:   Unspecified ; Created By:   Deanne Schmitt CMA; Reaction Status:   Active ; Category:   Drug ; Substance:   No Known Medication Allergies ; Type:   Allergy ; Updated By:   Deanne Schmitt CMA; Reviewed Date:   8/28/2019 2:23 PM CDT        Medication List   (As Of: 10/9/2019 11:53:14 AM CDT)   Prescription/Discharge Order    amphetamine-dextroamphetamine  :   amphetamine-dextroamphetamine ; Status:   Discontinued ; Ordered As Mnemonic:   Adderall XR 25 mg oral capsule, extended release ; Simple Display Line:   25 mg, 1 cap(s), PO, qAM, 30 cap(s), 0 Refill(s) ; Ordering Provider:   Jazmin Vargas MD; Catalog Code:   amphetamine-dextroamphetamine ; Order Dt/Tm:   9/13/2019 12:22:07 PM CDT          amphetamine-dextroamphetamine  :   amphetamine-dextroamphetamine ; Status:   Discontinued ; Ordered As Mnemonic:   Adderall XR 25 mg oral capsule, extended release ; Simple Display Line:   25 mg, 1 cap(s), PO, qAM, fill on or after 10/10/2019, 30 cap(s), 0  Refill(s) ; Ordering Provider:   Jazmin Vargas MD; Catalog Code:   amphetamine-dextroamphetamine ; Order Dt/Tm:   9/13/2019 12:20:37 PM CDT          ethinyl estradiol-norethindrone  :   ethinyl estradiol-norethindrone ; Status:   Prescribed ; Ordered As Mnemonic:   Loestrin 21 1/20 oral tablet ; Simple Display Line:   1 tab(s), PO, Daily, 84 tab(s), 3 Refill(s) ; Ordering Provider:   Jazmin Vargas MD; Catalog Code:   ethinyl estradiol-norethindrone ; Order Dt/Tm:   9/13/2019 12:20:42 PM CDT          lisdexamfetamine  :   lisdexamfetamine ; Status:   Prescribed ; Ordered As Mnemonic:   Vyvanse 30 mg oral capsule ; Simple Display Line:   30 mg, 1 cap(s), Oral, qam, 30 cap(s), 0 Refill(s) ; Ordering Provider:   Jazmin Vargas MD; Catalog Code:   lisdexamfetamine ; Order Dt/Tm:   9/20/2019 4:18:31 PM CDT          lisdexamfetamine  :   lisdexamfetamine ; Status:   Prescribed ; Ordered As Mnemonic:   Vyvanse 30 mg oral capsule ; Simple Display Line:   30 mg, 1 cap(s), Oral, qam, 30 cap(s), 0 Refill(s) ; Ordering Provider:   Jazmin Vargas MD; Catalog Code:   lisdexamfetamine ; Order Dt/Tm:   9/18/2019 5:36:45 PM CDT

## 2022-02-15 NOTE — PROGRESS NOTES
Patient:   SENG SCHRADER            MRN: [id number]            FIN: [id number]               Age:   17 years     Sex:  Female     :  2000   Associated Diagnoses:   Sore throat   Author:   Garima Chavarria      Visit Information      Date of Service: 10/04/2017 08:57 am  Performing Location: [facility]  Encounter#: [id number]      Primary Care Provider (PCP):  Radha Beatty    NPI# [id number]      Referring Provider:  Garima Chavarria    NPI# [id number]      Chief Complaint   10/4/2017 9:00 AM CDT    c/o HA, body aches, ear ache, sore throat x3 days.      History of Present Illness   reviewed presenting problem as above with patient  Here with step momPrecious.    Fever 102 yesterday, using advil and aspirin, last dose last night  Hard to swallow but keeping hydrated  Has had HAs and fatigue for a couple weeks, no hx of mono      Review of Systems   Constitutional:  Fever, Chills, Sweats, No fatigue.    Ear/Nose/Mouth/Throat:  Sore throat, No ear pain, No nasal congestion.    Respiratory:  No shortness of breath, No cough, No wheezing.    Gastrointestinal:  No nausea, No vomiting, No diarrhea.              Health Status   Allergies:    Allergic Reactions (Selected)  No known allergies   Medications:  (Selected)   Prescriptions  Prescribed  Loestrin 21  oral tablet: 1 tab(s), PO, Daily, # 84 tab(s), 3 Refill(s), Type: Maintenance, [id number], 1 tab(s) po daily  erythromycin 2% topical gel: See Instructions, Instructions: APPLY TWICE DAILY, # 30 gm, 1 Refill(s), [id number], APPLY TWICE DAILY   Problem list:    All Problems  ADD (Attention Deficit Disorder) / ICD-9-.00 / Confirmed  'Possible'  Viral pharyngitis / ICD-9- / Confirmed      Histories   Past Medical History:    Active  Viral pharyngitis (462)  ADD (Attention Deficit Disorder) (314.00)  Comments:  2011 CDT 11:51 AM CDT - Lorena Hood  'Possible'    Family History:    Gastro-esophageal reflux disease  Mother ()  Suicide..  Mother ()     Procedure history:    No active procedure history items have been selected or recorded.   Social History:        Tobacco Assessment            Never, Household tobacco concerns: No.      Substance Abuse Assessment            Never      Home and Environment Assessment            Lives with Father, Stepmother.  Substance abuse in household: No.  Smoker in household: No.  Risks in               environment: Does not wear helmet.      Sexual Assessment            Sexually active: No.        Physical Examination   Vital Signs   10/4/2017 9:00 AM CDT Temperature Tympanic 98.7 DegF    Peripheral Pulse Rate 92 bpm  HI    Pulse Site Radial artery    HR Method Manual    Systolic Blood Pressure 118 mmHg    Diastolic Blood Pressure 80 mmHg    Mean Arterial Pressure 93 mmHg    BP Site Right arm    BP Method Manual      Measurements from flowsheet : Measurements   10/4/2017 9:00 AM CDT Height Measured - Standard 63.5 in    Weight Measured - Standard 162.6 lb    BSA 1.82 m2    Body Mass Index 28.35 kg/m2    Body Mass Index Percentile 94.06      General:  Alert and oriented, No acute distress.    Eye:  Normal conjunctiva.    HENT:  Tympanic membranes are clear, Normal hearing, Oral mucosa is moist, No sinus tenderness.    Neck:  Supple, post oropharynx injected with exudate on on both tonsil paillars 2/4 plus and ant chain cervical lymph nodes palpable.    Respiratory:  Lungs are clear to auscultation, Respirations are non-labored, Breath sounds are equal, Symmetrical chest wall expansion.    Cardiovascular:  Normal rate, Regular rhythm, No murmur.    Musculoskeletal:  Normal range of motion, Normal gait.    Integumentary:  Warm, Dry, Pink, No rash.    Neurologic:  Alert, Oriented.    Psychiatric:  Cooperative.       Review / Management   Results review:  Lab results: 10/4/2017 9:23 AM CDT    Group A Strep POC         NOT  DETECTED  .         Interpretation: mono spot negative.       Impression and Plan   Diagnosis     Sore throat ([id number] J02.9).     Patient Instructions:       Counseled: Patient, Regarding diagnosis, Regarding treatment, Regarding medications, Verbalized understanding, Counseled on symptomatic management. Return to clinic for re evaluation if worsening, simply not improving, or failure to resolve.   .

## 2022-02-15 NOTE — NURSING NOTE
Phone Message    PCP:  EILEEN      Time of Call:  9:16       Person Calling:  step mother    Note:  Call requesting refill of her ADD medication.  Per chart note from 12/14/18 patient was given #30 and told to RTC for follow up.    Call transferred to scheduling for appointment.

## 2022-02-15 NOTE — PROGRESS NOTES
Chief Complaint    Follow up ADD  History of Present Illness      patient present to clinic today for follow up of her ADD.  verbal consent to see pt was obtained from her dad.  She has taken the adderal xr 10 mg, tolerating it.  Unsure how well it is working.  able to sleep at night, has had on ly 1 day of school.      She also continues to follow up with her counselor.  She meets with her principal weekly.  She does not feel she  needs meds for her anxiety at this time.       Review of systems is negative except as per HPI including:  no fevers, chills, sore throat, runny nose, nausea, vomiting, constipation, diarrhea, rash or new skin lesions, chest pain, palpitations, slurred speech, new paresthesia, shortness of breath or wheeze.      Exam:      Physical Exam       Vitals & Measurements       T: 98.1   F (Tympanic)  HR: 65(Peripheral)  BP: 110/72  SpO2: 99%        HT: 64 in  WT: 159.2 lb       General: alert and oriented ×3 no acute distress.      HEENT: pupils are equal round and reactive to light extraocular motion is intact. Normocephalic and atraumatic.       Hearing is grossly normal and there is no otorrhea.       Nares are patent there is no rhinorrhea.       Mucous membranes are moist and pink.      Chest: has bilateral rise with no increased work of breathing.      Cardiovascular: normal perfusion and brisk capillary refill.      Musculoskeletal: no gross focal abnormalities and normal gait.      Neuro: no gross focal abnormalities and memory seems intact.      Psychiatric: speech is clear and coherent and fluent. Patient dressed appropriately for the weather. Mood is appropriate and affect is full.                     Discussed with patient to return to clinic if symptoms worsen or do not improve         Assessment/Plan       ADD (attention deficit disorder) (F98.8)         will cont with current Rx, pt will see if she concentrates better in am than afte school for homework and try to determine if it  is helping and when it wears off.         Orders:          [id number] office outpatient visit 15 minutes (Charge), Quantity: 1, ADD (attention deficit disorder)                Orders:      15 minutes spent with patient in direct face to face contact, > 50% of time spent counseling and coordinating care.   Patient Information     Name:SENG SCHRADER      Address:      [id number] [place] [id number]     Sex:Female     YOB: 2000     [phone]     Emergency Contact:RAGHU SCHRADER     MRN:[id number]     FIN:[id number]     Location:[facility]     Date of Service:09/05/2018      Primary Care Physician:       Jazmin Vargas MD, [phone]      Attending Physician:       Jazmin Vargas MD, [phone]  Problem List/Past Medical History    Ongoing     ADD (attention deficit disorder)       Comments: 'Possible'    Historical     Viral pharyngitis  Procedure/Surgical History     No previous procedures        Medications     Adderall XR 10 mg oral capsule, extended release: 10 mg, 1 cap(s), PO, qAM, 30 cap(s), 0 Refill(s).     Loestrin 21 1/20 oral tablet: 1 tab(s), PO, Daily, 84 tab(s), 3 Refill(s).          Allergies    No Known Medication Allergies  Social History    Smoking Status - 09/05/2018     Never smoker     Alcohol      Never, 09/05/2018     Home and Environment      Lives with Father, Stepmother. Substance abuse in household: No. Smoker in household: No. Risks in environment: Does not wear helmet., 08/11/2015     Sexual      Sexually active: No., 08/11/2015     Substance Abuse      Never, 08/11/2015     Tobacco      Never, Household tobacco concerns: No., 05/13/2011  Family History    Gastro-esophageal reflux disease: Mother.    Suicide..: Mother.  Immunizations      Vaccine Date Status      human papillomavirus vaccine 06/03/2014 Given      human papillomavirus vaccine 01/17/2014 Given      DTaP-IPV 01/17/2014 Recorded      tetanus/diphth/pertuss  (Tdap) adult/adol 01/17/2014 Recorded      influenza (LAIV) 11/11/2013 Given      human papillomavirus vaccine 11/11/2013 Given      meningococcal conjugate vaccine 07/19/2012 Given      tetanus/diphth/pertuss (Tdap) adult/adol 07/19/2012 Given      influenza 09/22/2011 Given      influenza virus vaccine, inactivated 10/26/2010 Given      influenza, H1N1, live 01/18/2010 Recorded      influenza, H1N1, live 12/15/2009 Recorded      varicella 06/01/2009 Recorded      IPV 07/20/2006 Recorded      MMR (measles/mumps/rubella) 07/20/2006 Recorded      DTaP 07/20/2006 Recorded      Hep B-Hib 10/03/2002 Recorded      DTaP 10/03/2002 Recorded      MMR (measles/mumps/rubella) 07/11/2002 Recorded      varicella 07/11/2002 Recorded      pneumococcal (PCV7) 03/28/2002 Recorded      IPV 01/03/2002 Recorded      pneumococcal (PCV7) 01/03/2002 Recorded      DTaP 01/03/2002 Recorded      IPV 2001 Recorded      Hep B-Hib 2001 Recorded      DTaP 2001 Recorded      IPV 2001 Recorded      Hep B-Hib 2001 Recorded      pneumococcal (PCV7) 2001 Recorded      DTaP 2001 Recorded

## 2022-02-15 NOTE — TELEPHONE ENCOUNTER
---------------------  From: Deanne Og   Sent: 9/11/2020 2:56:18 PM CDT  Subject: Curbside Testing     Patient was in for curbside testing.   Per Dr.Monica Vargas              O2 sat= 95%  Specimen sent to [facility].     Forms faxed to [facility]      Priority# 0

## 2022-02-15 NOTE — NURSING NOTE
Comprehensive Intake Entered On:  8/5/2021 9:52 AM CDT    Performed On:  8/5/2021 9:48 AM CDT by Yashira Gray               Summary   Chief Complaint :   ADHD med check follow up. Refill reqest   Menstrual Status :   Menarcheal   Weight Measured :   162 lb(Converted to: 162 lb 0 oz, 73.482 kg)    Height/Length Estimated :   64 in(Converted to: 5 ft 4 in, 162.56 cm)    Systolic Blood Pressure :   100 mmHg   Diastolic Blood Pressure :   80 mmHg   Mean Arterial Pressure :   87 mmHg   Peripheral Pulse Rate :   71 bpm   BP Site :   Right arm   BP Method :   Manual   Respiratory Rate :   16 br/min   Oxygen Saturation :   98 %   Yashira Gray - 8/5/2021 9:48 AM CDT   Health Status   Allergies Verified? :   Yes   Medication History Verified? :   Yes   Pre-Visit Planning Status :   Completed   Tobacco Use? :   Never smoker   Yashira Grya - 8/5/2021 9:48 AM CDT   Consents   Consent for Immunization Exchange :   Consent Granted   Consent for Immunizations to Providers :   Consent Granted   Yashira Gray - 8/5/2021 9:48 AM CDT   Meds / Allergies   (As Of: 8/5/2021 9:52:46 AM CDT)   Allergies (Active)   Concerta  Estimated Onset Date:   Unspecified ; Reactions:   Anxiety, Irritability ; Created By:   Chloé Zapata CMA; Reaction Status:   Active ; Category:   Drug ; Substance:   Concerta ; Type:   Side Effect ; Updated By:   Chloé Zapata CMA; Reviewed Date:   8/5/2021 9:51 AM CDT      methylphenidate  Estimated Onset Date:   Unspecified ; Reactions:   Anxiety, Irritability ; Created By:   Chloé Zapata CMA; Reaction Status:   Active ; Category:   Drug ; Substance:   methylphenidate ; Type:   Side Effect ; Updated By:   Chloé Zapata CMA; Reviewed Date:   8/5/2021 9:51 AM CDT      Vyvanse  Estimated Onset Date:   Unspecified ; Reactions:   Irritability ; Created By:   Jazmin Vargas MD; Reaction Status:   Active ; Category:   Drug ; Substance:   Vyvanse ; Type:   Side Effect ; Severity:   Moderate ; Updated By:    Jazmin Vargas MD; Reviewed Date:   8/5/2021 9:51 AM CDT        Medication List   (As Of: 8/5/2021 9:52:46 AM CDT)   Prescription/Discharge Order    amphetamine-dextroamphetamine  :   amphetamine-dextroamphetamine ; Status:   Prescribed ; Ordered As Mnemonic:   Adderall XR 25 mg oral capsule, extended release ; Simple Display Line:   25 mg, 1 cap(s), Oral, qam, 30 cap(s), 0 Refill(s) ; Ordering Provider:   Jazmin Vargas MD; Catalog Code:   amphetamine-dextroamphetamine ; Order Dt/Tm:   2/11/2021 10:08:37 AM CST          amphetamine-dextroamphetamine  :   amphetamine-dextroamphetamine ; Status:   Prescribed ; Ordered As Mnemonic:   Adderall XR 25 mg oral capsule, extended release ; Simple Display Line:   25 mg, 1 cap(s), Oral, qam, may fill in 28 days, 30 cap(s), 0 Refill(s) ; Ordering Provider:   Jazmin Vargas MD; Catalog Code:   amphetamine-dextroamphetamine ; Order Dt/Tm:   2/11/2021 10:06:05 AM CST          ethinyl estradiol-norethindrone  :   ethinyl estradiol-norethindrone ; Status:   Processing ; Ordered As Mnemonic:   ethinyl estradiol-norethindrone 20 mcg-1 mg oral tablet ; Ordering Provider:   Jazmin Vargas MD; Action Display:   Complete ; Catalog Code:   ethinyl estradiol-norethindrone ; Order Dt/Tm:   8/5/2021 9:51:03 AM CDT          lisdexamfetamine  :   lisdexamfetamine ; Status:   Processing ; Ordered As Mnemonic:   Vyvanse 20 mg oral capsule ; Ordering Provider:   Jazmin Vargas MD; Action Display:   Complete ; Catalog Code:   lisdexamfetamine ; Order Dt/Tm:   8/5/2021 9:50:46 AM CDT            Social History   Social History   (As Of: 8/5/2021 9:52:46 AM CDT)   Alcohol:        Never   (Last Updated: 9/5/2018 8:33:10 AM CDT by Nadiya Sinclair)          Tobacco:        Never, Household tobacco concerns: No.   (Last Updated: 5/13/2011 11:48:42 AM CDT by Lorena Hood)   Never (less than 100 in lifetime)   (Last Updated: 12/15/2020 3:18:12 PM CST by Joaquina Gallego CMA)           Electronic Cigarette/Vaping:        Electronic Cigarette Use: Never.   (Last Updated: 12/15/2020 3:18:12 PM CST by Joaquina Gallego CMA)          Substance Abuse:        Never   (Last Updated: 8/11/2015 2:20:36 PM CDT by Henry Florian CMA)          Home/Environment:        Lives with Father, Stepmother.  Substance abuse in household: No.  Smoker in household: No.  Risks in environment: Does not wear helmet.   (Last Updated: 8/11/2015 2:25:12 PM CDT by Henry Florian CMA)          Sexual:        Sexually active: No.   (Last Updated: 8/11/2015 2:21:05 PM CDT by Henry Florian CMA)

## 2022-02-15 NOTE — PROGRESS NOTES
Chief Complaint    Rx refill- Methylphenidate  History of Present Illness       patient present to clinic today for follow up of ADD/ADHD.  She does not tolerate the Concerta as well as she has tolerated her Adderall in the past.  Unfortunately her Adderall is not covered by her insurance.  She tried not taking it for several days and found that she was just very scatterbrained so thinks that she does need to be able to treat her ADHD.      She is looking forward to a job interview tonight.  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  She is excited because the dad is a  and took her last nanny on several trips with them.      The medication improves  ability to function at work and at home.       denies insomnia or headaches.       [place] PMDP searched, no red flags noted.  Urine drug screen is up to date.      Review of systems is negative except as per HPI including:  no fevers, chills, sore throat, runny nose, nausea, vomiting, constipation, diarrhea, rash or new skin lesions, chest pain, palpitations, slurred speech, new paresthesia, shortness of breath or wheeze.      Exam:   also see vitals below      General: alert and oriented ×3 no acute distress.      HEENT: pupils are equal round and reactive to light extraocular motion is intact. Normocephalic and atraumatic.       Hearing is grossly normal and there is no otorrhea.       Nares are patent there is no rhinorrhea.       Mucous membranes are moist and pink.      Chest: has bilateral rise with no increased work of breathing.      Cardiovascular: normal perfusion and brisk capillary refill.      Musculoskeletal: no gross focal abnormalities and normal gait.      Neuro: no gross focal abnormalities and memory seems intact.      Psychiatric: speech is clear and coherent and fluent. Patient dressed appropriately for the weather. Mood is appropriate and affect is full.                     Discussed with patient to return to clinic if symptoms worsen or do  not improve and for next Annual exam.         ADD        Discussed with use of stimulant will require close monitoring.   Most common side effects are anorexia (80%), sleep disturbance  and weight loss.  Tic behaviors are more unusual but can occur.  Discussed that at times dose adjustment needs to be made and there is a possibility of addiction behavior.      15 minutes spent with patient in direct face to face contact, > 50% of time spent counseling and coordinating care.   Physical Exam   Vitals & Measurements    T: 97.6   F (Tympanic)  HR: 73(Peripheral)  BP: 118/70  SpO2: 99%     WT: 156.6 lb   Assessment/Plan       ADHD (attention deficit hyperactivity disorder) (F90.0)        We will try switching from the Concerta long-acting methylphenidate to methylphenidate immediate release and see if she tolerates this better she will plan to return to clinic in 1 month for follow-up.         Ordered:          methylphenidate, = 1 tab(s) ( 10 mg ), Oral, bid, # 60 tab(s), 0 Refill(s), Type: Maintenance, Pharmacy: [facility] [place], 1 tab(s) Oral bid, (Ordered)                Orders:         Return to Clinic (Request), Return in 4 weeks  Patient Information     Name:SENG SCHRADER      Address:      [address] [id number]     Sex:Female     YOB: 2000     [phone]     Emergency Contact:RAGHU SCHRADER     MRN:[id number]     FIN:[id number]     Location:[facility]     Date of Service:02/13/2020      Primary Care Physician:       Jazmin Vargas MD, [phone]      Attending Physician:       Jazmin Vargas MD, [phone]  Problem List/Past Medical History    Ongoing     ADD (attention deficit disorder)       Comments: 'Possible'     ADHD (attention deficit hyperactivity disorder)     ADHD (attention deficit hyperactivity disorder), inattentive type     Concussion     Needle phobia     Syncope     Uses oral contraception    Historical      Viral pharyngitis  Procedure/Surgical History     No previous procedures  Medications    Concerta 36 mg/24 hr oral tablet, extended release, 36 mg= 1 tab(s), Oral, qam    Loestrin 21 1/20 oral tablet, 1 tab(s), Oral, daily, 3 refills    methylphenidate 10 mg oral tablet, 10 mg= 1 tab(s), Oral, bid  Allergies    Vyvanse (Irritability)  Social History    Smoking Status - 02/13/2020     Never smoker     Alcohol      Never, 09/05/2018     Home/Environment      Lives with Father, Stepmother. Substance abuse in household: No. Smoker in household: No. Risks in environment: Does not wear helmet., 08/11/2015     Sexual      Sexually active: No., 08/11/2015     Substance Abuse      Never, 08/11/2015     Tobacco      Never, Household tobacco concerns: No., 05/13/2011  Family History    Gastro-esophageal reflux disease: Mother.    Suicide..: Mother.  Immunizations      Vaccine Date Status          meningococcal conjugate vaccine 11/16/2018 Given          Hep A, pediatric/adolescent 11/16/2018 Given          influenza virus vaccine, inactivated 11/09/2018 Given          human papillomavirus vaccine 06/03/2014 Given          human papillomavirus vaccine 01/17/2014 Given          DTaP-IPV 01/17/2014 Recorded          tetanus/diphth/pertuss (Tdap) adult/adol 01/17/2014 Recorded          influenza (LAIV) 11/11/2013 Given          human papillomavirus vaccine 11/11/2013 Given          meningococcal conjugate vaccine 07/19/2012 Given          tetanus/diphth/pertuss (Tdap) adult/adol 07/19/2012 Given          influenza 09/22/2011 Given          influenza virus vaccine, inactivated 10/26/2010 Given          influenza, H1N1, live 01/18/2010 Recorded          influenza, H1N1, live 12/15/2009 Recorded          varicella 06/01/2009 Recorded          DTaP 07/20/2006 Recorded          MMR (measles/mumps/rubella) 07/20/2006 Recorded          IPV 07/20/2006 Recorded          DTaP 10/03/2002 Recorded          Hep B-Hib 10/03/2002 Recorded           varicella 07/11/2002 Recorded          MMR (measles/mumps/rubella) 07/11/2002 Recorded          pneumococcal (PCV7) 03/28/2002 Recorded          DTaP 01/03/2002 Recorded          pneumococcal (PCV7) 01/03/2002 Recorded          IPV 01/03/2002 Recorded          DTaP 2001 Recorded          Hep B-Hib 2001 Recorded          IPV 2001 Recorded          DTaP 2001 Recorded          pneumococcal (PCV7) 2001 Recorded          Hep B-Hib 2001 Recorded          IPV 2001 Recorded  Lab Results       Lab Results (Last 4 results within 90 days)        Group A Strep POC: NOT DETECTED (12/09/19 11:41:00)       Culture Strep A: See comment (12/09/19 11:48:00)

## 2022-02-15 NOTE — NURSING NOTE
Comprehensive Intake Entered On:  10/5/2021 3:35 PM CDT    Performed On:  10/5/2021 3:30 PM CDT by Sherita Fernandez CMA               Summary   Chief Complaint :   R side ST with bleeding and discharge. Sx started 6 days ago. Also would like Adderall filled.    Menstrual Status :   Menarcheal   Weight Measured :   155 lb(Converted to: 155 lb 0 oz, 70.307 kg)    Height/Length Estimated :   64 in(Converted to: 5 ft 4 in, 162.56 cm)    Systolic Blood Pressure :   119 mmHg   Diastolic Blood Pressure :   79 mmHg   Mean Arterial Pressure :   92 mmHg   Peripheral Pulse Rate :   91 bpm   BP Site :   Right arm   Pulse Site :   Radial artery   BP Method :   Electronic   HR Method :   Electronic   Temperature Tympanic :   98.5 DegF(Converted to: 36.9 DegC)    Sherita Fernandez CMA - 10/5/2021 3:30 PM CDT   Health Status   Allergies Verified? :   Yes   Medication History Verified? :   Yes   Pre-Visit Planning Status :   Not completed   Sherita Fernandez CMA - 10/5/2021 3:30 PM CDT   Meds / Allergies   (As Of: 10/5/2021 3:35:08 PM CDT)   Allergies (Active)   Concerta  Estimated Onset Date:   Unspecified ; Reactions:   Anxiety, Irritability ; Created By:   Chloé Zapata CMA; Reaction Status:   Active ; Category:   Drug ; Substance:   Concerta ; Type:   Side Effect ; Updated By:   Chloé Zapata CMA; Reviewed Date:   8/5/2021 9:51 AM CDT      methylphenidate  Estimated Onset Date:   Unspecified ; Reactions:   Anxiety, Irritability ; Created By:   Chloé Zapata CMA; Reaction Status:   Active ; Category:   Drug ; Substance:   methylphenidate ; Type:   Side Effect ; Updated By:   Chloé Zapata CMA; Reviewed Date:   8/5/2021 9:51 AM CDT      Vyvanse  Estimated Onset Date:   Unspecified ; Reactions:   Irritability ; Created By:   Jazmin Vargas MD; Reaction Status:   Active ; Category:   Drug ; Substance:   Vyvanse ; Type:   Side Effect ; Severity:   Moderate ; Updated By:   Jazmin Vargas MD; Reviewed Date:   8/5/2021 9:51 AM  CDT        Medication List   (As Of: 10/5/2021 3:35:08 PM CDT)   Prescription/Discharge Order    amphetamine-dextroamphetamine  :   amphetamine-dextroamphetamine ; Status:   Prescribed ; Ordered As Mnemonic:   Adderall XR 25 mg oral capsule, extended release ; Simple Display Line:   25 mg, 1 cap(s), Oral, qam, 30 cap(s), 0 Refill(s) ; Ordering Provider:   Jazmin Vargas MD; Catalog Code:   amphetamine-dextroamphetamine ; Order Dt/Tm:   8/5/2021 10:39:06 AM CDT          amphetamine-dextroamphetamine  :   amphetamine-dextroamphetamine ; Status:   Prescribed ; Ordered As Mnemonic:   Adderall XR 25 mg oral capsule, extended release ; Simple Display Line:   25 mg, 1 cap(s), Oral, qam, may fill in 28 days, 30 cap(s), 0 Refill(s) ; Ordering Provider:   Jazmin Vargas MD; Catalog Code:   amphetamine-dextroamphetamine ; Order Dt/Tm:   8/5/2021 10:39:05 AM CDT          amphetamine-dextroamphetamine  :   amphetamine-dextroamphetamine ; Status:   Completed ; Ordered As Mnemonic:   Adderall XR 25 mg oral capsule, extended release ; Simple Display Line:   25 mg, 1 cap(s), Oral, qam, 30 cap(s), 0 Refill(s) ; Ordering Provider:   Jazmin Vargas MD; Catalog Code:   amphetamine-dextroamphetamine ; Order Dt/Tm:   2/11/2021 10:08:37 AM CST          amphetamine-dextroamphetamine  :   amphetamine-dextroamphetamine ; Status:   Completed ; Ordered As Mnemonic:   Adderall XR 25 mg oral capsule, extended release ; Simple Display Line:   25 mg, 1 cap(s), Oral, qam, may fill in 28 days, 30 cap(s), 0 Refill(s) ; Ordering Provider:   Jazmin Vargas MD; Catalog Code:   amphetamine-dextroamphetamine ; Order Dt/Tm:   2/11/2021 10:06:05 AM CST

## 2022-02-15 NOTE — NURSING NOTE
CAGE Assessment Entered On:  11/27/2019 9:46 AM CST    Performed On:  11/26/2019 9:46 AM CST by Henry Florian CMA               Assessment   Have you ever felt you should cut down on your drinking :   No   Have people annoyed you by criticizing your drinking :   No   Have you ever felt bad or guilty about your drinking :   No   Have you ever taken a drink first thing in the morning to steady your nerves or get rid of a hangover (Eye-opener) :   No   CAGE Score :   0    Henry Florian CMA - 11/27/2019 9:46 AM CST

## 2022-02-15 NOTE — NURSING NOTE
Generalized Anxiety Disorder Screening Entered On:  11/27/2019 9:47 AM CST    Performed On:  11/26/2019 9:46 AM CST by Henry Florian CMA               Generalized Anxiety Disorder Screening   AI Nervous, Anxious On Edge :   Nearly every day   AI Control Worrying B :   Nearly every day   AI Worrying Too Much :   Nearly every day   AI Restless :   Not at all   AI Easily Annoyed/Irritable :   Nearly every day   AI Afraid :   Not at all   AI Trouble Relaxing :   Nearly every day   AI Total Screening Score :   15    AI Difficulty with Work, Home, Others :   Somewhat difficult   Henry Florian CMA - 11/27/2019 9:46 AM CST

## 2022-02-15 NOTE — NURSING NOTE
Comprehensive Intake Entered On:  8/28/2019 2:19 PM CDT    Performed On:  8/28/2019 2:14 PM CDT by Les ANGELES, Lorena               Summary   Chief Complaint :   pre-employment exam--[employer].   Menstrual Status :   Menarcheal   Weight Measured :   140.6 lb(Converted to: 140 lb 10 oz, 63.78 kg)    Height Measured :   64 in(Converted to: 5 ft 4 in, 162.56 cm)    Body Mass Index :   24.13 kg/m2   Body Surface Area :   1.7 m2   Systolic Blood Pressure :   114 mmHg   Diastolic Blood Pressure :   72 mmHg   Mean Arterial Pressure :   86 mmHg   Peripheral Pulse Rate :   84 bpm   BP Site :   Right arm   Pulse Site :   Radial artery   BP Method :   Manual   HR Method :   Manual   Temperature Tympanic :   98.5 DegF(Converted to: 36.9 DegC)    Lorena Saldana MA - 8/28/2019 2:14 PM CDT   Health Status   Allergies Verified? :   Yes   Medication History Verified? :   Yes   Medical History Verified? :   Yes   Pre-Visit Planning Status :   Completed   Tobacco Use? :   Never smoker   Lorena Saldana MA - 8/28/2019 2:14 PM CDT   Consents   Consent for Immunization Exchange :   Consent Granted   Consent for Immunizations to Providers :   Consent Granted   Lorena Saldana MA - 8/28/2019 2:14 PM CDT   Meds / Allergies   (As Of: 8/28/2019 2:19:31 PM CDT)   Allergies (Active)   No known allergies  Estimated Onset Date:   Unspecified ; Created By:   Suri Delacruz LPN; Reaction Status:   Active ; Category:   Drug ; Substance:   No known allergies ; Type:   Allergy ; Updated By:   Suri Delacruz LPN; Reviewed Date:   8/3/2019 12:31 PM CDT      No Known Medication Allergies  Estimated Onset Date:   Unspecified ; Created By:   Deanne Schmitt CMA; Reaction Status:   Active ; Category:   Drug ; Substance:   No Known Medication Allergies ; Type:   Allergy ; Updated By:   Deanne Schmitt CMA; Reviewed Date:   8/3/2019 12:31 PM CDT        Medication List   (As Of: 8/28/2019 2:19:31 PM CDT)   Prescription/Discharge Order    amphetamine-dextroamphetamine   :   amphetamine-dextroamphetamine ; Status:   Prescribed ; Ordered As Mnemonic:   Adderall XR 25 mg oral capsule, extended release ; Simple Display Line:   25 mg, 1 cap(s), PO, qAM, fill on or after 8/8/2019, 30 cap(s), 0 Refill(s) ; Ordering Provider:   Steven Katz PA-C; Catalog Code:   amphetamine-dextroamphetamine ; Order Dt/Tm:   8/8/2019 2:25:01 PM          amphetamine-dextroamphetamine  :   amphetamine-dextroamphetamine ; Status:   Completed ; Ordered As Mnemonic:   Adderall XR 25 mg oral capsule, extended release ; Simple Display Line:   25 mg, 1 cap(s), PO, qAM, 30 cap(s), 0 Refill(s) ; Ordering Provider:   Jazmin Vargas MD; Catalog Code:   amphetamine-dextroamphetamine ; Order Dt/Tm:   6/19/2019 4:27:39 PM          amphetamine-dextroamphetamine  :   amphetamine-dextroamphetamine ; Status:   Completed ; Ordered As Mnemonic:   Adderall XR 25 mg oral capsule, extended release ; Simple Display Line:   25 mg, 1 cap(s), PO, qAM, 30 cap(s), 0 Refill(s) ; Ordering Provider:   Jazmin Vargas MD; Catalog Code:   amphetamine-dextroamphetamine ; Order Dt/Tm:   5/15/2019 11:28:32 AM          ethinyl estradiol-norethindrone  :   ethinyl estradiol-norethindrone ; Status:   Prescribed ; Ordered As Mnemonic:   Loestrin 21 1/20 oral tablet ; Simple Display Line:   1 tab(s), PO, Daily, 84 tab(s), 3 Refill(s) ; Ordering Provider:   Jazmin Vargas MD; Catalog Code:   ethinyl estradiol-norethindrone ; Order Dt/Tm:   8/30/2018 6:03:20 PM            Social History   Social History   (As Of: 8/28/2019 2:19:31 PM CDT)   Alcohol:        Never   (Last Updated: 9/5/2018 8:33:10 AM CDT by Nadiya Sinclair)          Tobacco:        Never, Household tobacco concerns: No.   (Last Updated: 5/13/2011 11:48:42 AM CDT by Lorena Hood)          Substance Abuse:        Never   (Last Updated: 8/11/2015 2:20:36 PM CDT by Henry Florian CMA)          Home/Environment:        Lives with Father, Stepmother.  Substance abuse in household:  No.  Smoker in household: No.  Risks in environment: Does not wear helmet.   (Last Updated: 8/11/2015 2:25:12 PM CDT by Henry Florian CMA)          Sexual:        Sexually active: No.   (Last Updated: 8/11/2015 2:21:05 PM CDT by Henry Florian CMA)

## 2022-02-15 NOTE — TELEPHONE ENCOUNTER
---------------------  From: Jazmin Vargas MD   To: Alice Lucas CMA;     Sent: 9/18/2019 5:37:49 PM CDT  Subject: RE: Med change     I sent a 1 month supply, would like to see patient before next refill to discuss how it is tolerated.    ---------------------  From: Alice Lucas CMA   To: Jazmin Vargas MD;     Sent: 9/18/2019 9:28:29 AM CDT  Subject: Med change     Patient is okay switching from Adderall to Vyvanse. Please send new script to [facility].

## 2022-02-15 NOTE — NURSING NOTE
Comprehensive Intake Entered On:  12/6/2021 2:21 PM CST    Performed On:  12/6/2021 2:18 PM CST by Aimee Ernandez LPN               Summary   Chief Complaint :   would like to have some STI testing done, denies any current symptoms   Menstrual Status :   Menarcheal   Weight Measured :   158 lb(Converted to: 158 lb 0 oz, 71.668 kg)    Height Measured :   64 in(Converted to: 5 ft 4 in, 162.56 cm)    Body Mass Index :   27.12 kg/m2 (HI)    Body Surface Area :   1.8 m2   Height/Length Estimated :   64 in(Converted to: 5 ft 4 in, 162.56 cm)    Systolic Blood Pressure :   110 mmHg   Diastolic Blood Pressure :   62 mmHg   Mean Arterial Pressure :   78 mmHg   Peripheral Pulse Rate :   60 bpm   BP Site :   Right arm   BP Method :   Manual   Temperature Tympanic :   97.9 DegF(Converted to: 36.6 DegC)    Oxygen Saturation :   99 %   Aimee Ernadnez LPN - 12/6/2021 2:18 PM CST   Health Status   Allergies Verified? :   Yes   Medication History Verified? :   Yes   Medical History Verified? :   No   Pre-Visit Planning Status :   Not completed   Tobacco Use? :   Never smoker   Aimee Ernandez LPN - 12/6/2021 2:18 PM CST   Meds / Allergies   (As Of: 12/6/2021 2:21:30 PM CST)   Allergies (Active)   Concerta  Estimated Onset Date:   Unspecified ; Reactions:   Anxiety, Irritability ; Created By:   Chloé Zapata CMA; Reaction Status:   Active ; Category:   Drug ; Substance:   Concerta ; Type:   Side Effect ; Updated By:   Chloé Zapata CMA; Reviewed Date:   11/15/2021 4:57 PM CST      methylphenidate  Estimated Onset Date:   Unspecified ; Reactions:   Anxiety, Irritability ; Created By:   Chloé Zapata CMA; Reaction Status:   Active ; Category:   Drug ; Substance:   methylphenidate ; Type:   Side Effect ; Updated By:   Chloé Zapata CMA; Reviewed Date:   11/15/2021 4:57 PM CST      Vyvanse  Estimated Onset Date:   Unspecified ; Reactions:   Irritability ; Created By:   Jazmin Vargas MD; Reaction Status:   Active ;  Category:   Drug ; Substance:   Vyvanse ; Type:   Side Effect ; Severity:   Moderate ; Updated By:   Jazmin Vargas MD; Reviewed Date:   11/15/2021 4:57 PM CST        Medication List   (As Of: 12/6/2021 2:21:30 PM CST)   Prescription/Discharge Order    amphetamine-dextroamphetamine  :   amphetamine-dextroamphetamine ; Status:   Prescribed ; Ordered As Mnemonic:   Adderall XR 25 mg oral capsule, extended release ; Simple Display Line:   25 mg, 1 cap(s), Oral, qam, may fill in 28 days, 30 cap(s), 0 Refill(s) ; Ordering Provider:   Jazmin Vargas MD; Catalog Code:   amphetamine-dextroamphetamine ; Order Dt/Tm:   10/6/2021 1:46:36 PM CDT          amphetamine-dextroamphetamine  :   amphetamine-dextroamphetamine ; Status:   Prescribed ; Ordered As Mnemonic:   Adderall XR 25 mg oral capsule, extended release ; Simple Display Line:   25 mg, 1 cap(s), Oral, qam, 30 cap(s), 0 Refill(s) ; Ordering Provider:   Jazmin Vargas MD; Catalog Code:   amphetamine-dextroamphetamine ; Order Dt/Tm:   10/6/2021 1:46:39 PM CDT

## 2022-02-15 NOTE — TELEPHONE ENCOUNTER
---------------------  From: Aurora Hines (Appointment Pool ([id number]))   To: Joaquina Gallego CMA;     Sent: 12/15/2020 3:34:27 PM CST  Subject: RE: COVID     pt scheduled        ---------------------  From: Joaquina Gallego CMA   To: Appointment Pool ([id number]);     Sent: 12/15/2020 3:29:46 PM CST  Subject: COVID     please contact pt to set up curbside per SALLY

## 2022-02-15 NOTE — PROGRESS NOTES
Chief Complaint    Verbal consent to see and treat per dadJonathan. f/u ADHD. No new concerns.  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  seems to also help with life with getting tasks done on the weekends      Review of systems is negative except as per HPI including:  no fevers, chills, sore throat, runny nose, nausea, vomiting, constipation, diarrhea, rash or new skin lesions, chest pain, palpitations, slurred speech, new paresthesia, shortness of breath or wheeze.  no SI or HI, no a/v hallucinations      Exam:      General: alert and oriented ×3 no acute distress.      HEENT: pupils are equal round and reactive to light extraocular motion is intact. Normocephalic and atraumatic.       Hearing is grossly normal and there is no otorrhea.       Nares are patent there is no rhinorrhea.       Mucous membranes are moist and pink.      Chest: has bilateral rise with no increased work of breathing.      Cardiovascular: normal perfusion and brisk capillary refill.      Musculoskeletal: no gross focal abnormalities and normal gait.      Neuro: no gross focal abnormalities and memory seems intact.      Psychiatric: speech is clear and coherent and fluent. Patient dressed appropriately for the weather. Mood is appropriate and affect is full.                     Discussed with patient to return to clinic if symptoms worsen or do not improve.  Physical Exam   Vitals & Measurements    T: 97.4   F (Tympanic)  HR: 88(Peripheral)  BP: 128/78  SpO2: 98%     HT: 64 in  WT: 150 lb   Assessment/Plan       ADD (attention deficit disorder) (F98.8)         sent a prescription for patient to have filled next month, she can call for a refill in 01/2019 and will need to be seen in 02/2019         Orders:          29346 office outpatient visit 15 minutes (Charge), Quantity: 1, ADD (attention deficit disorder)   Immunization due                Immunization due (Z23)         Orders:          26145 office outpatient visit 15 minutes (Charge), Quantity: 1, ADD (attention deficit disorder)  Immunization due               15 minutes spent with patient in direct face to face contact, > 50% of time spent counseling and coordinating care.   Patient Information     Name:SENG SCHRADER      Address:      [id number] [place] [address]     Sex:Female     YOB: 2000     [phone]     Emergency Contact:RAGHU SCHRADER     MRN:[id number]     FIN:[id number]     Location:[facility]     Date of Service:11/09/2018      Primary Care Physician:       Jazmin Vargas MD, [phone]      Attending Physician:       Jazmin Vargas MD, [phone]  Problem List/Past Medical History    Ongoing     ADD (attention deficit disorder)       Comments: 'Possible'    Historical     Viral pharyngitis  Procedure/Surgical History     No previous procedures        Medications     Adderall XR 10 mg oral capsule, extended release: 10 mg, 1 cap(s), PO, qAM, 30 cap(s), 0 Refill(s).     Loestrin 21 1/20 oral tablet: 1 tab(s), PO, Daily, 84 tab(s), 3 Refill(s).     Adderall XR 20 mg oral capsule, extended release: 20 mg, 1 cap(s), PO, qAM, 30 cap(s), 0 Refill(s).     amphetamine-dextroamphetamine 20 mg oral capsule, extended release: See Instructions, Take 1 capsule by mouth every morning, 30 cap(s), 0 Refill(s).     amphetamine-dextroamphetamine 20 mg oral capsule, extended release: See Instructions, Take 1 capsule by mouth every morning, do not fill until 12/7/2018, 30 cap(s), 0 Refill(s).          Allergies    No Known Medication Allergies  Social History    Smoking Status - 11/09/2018     Never smoker     Alcohol      Never, 09/05/2018     Home and Environment      Lives with Father, Stepmother. Substance abuse in household: No. Smoker in household: No. Risks in environment: Does not wear  helmet., 08/11/2015     Sexual      Sexually active: No., 08/11/2015     Substance Abuse      Never, 08/11/2015     Tobacco      Never, Household tobacco concerns: No., 05/13/2011  Family History    Gastro-esophageal reflux disease: Mother.    Suicide..: Mother.  Immunizations      Vaccine Date Status      influenza virus vaccine, inactivated 11/09/2018 Given      human papillomavirus vaccine 06/03/2014 Given      human papillomavirus vaccine 01/17/2014 Given      DTaP-IPV 01/17/2014 Recorded      tetanus/diphth/pertuss (Tdap) adult/adol 01/17/2014 Recorded      influenza (LAIV) 11/11/2013 Given      human papillomavirus vaccine 11/11/2013 Given      meningococcal conjugate vaccine 07/19/2012 Given      tetanus/diphth/pertuss (Tdap) adult/adol 07/19/2012 Given      influenza 09/22/2011 Given      influenza virus vaccine, inactivated 10/26/2010 Given      influenza, H1N1, live 01/18/2010 Recorded      influenza, H1N1, live 12/15/2009 Recorded      varicella 06/01/2009 Recorded      IPV 07/20/2006 Recorded      MMR (measles/mumps/rubella) 07/20/2006 Recorded      DTaP 07/20/2006 Recorded      Hep B-Hib 10/03/2002 Recorded      DTaP 10/03/2002 Recorded      MMR (measles/mumps/rubella) 07/11/2002 Recorded      varicella 07/11/2002 Recorded      pneumococcal (PCV7) 03/28/2002 Recorded      IPV 01/03/2002 Recorded      pneumococcal (PCV7) 01/03/2002 Recorded      DTaP 01/03/2002 Recorded      IPV 2001 Recorded      Hep B-Hib 2001 Recorded      DTaP 2001 Recorded      IPV 2001 Recorded      Hep B-Hib 2001 Recorded      pneumococcal (PCV7) 2001 Recorded      DTaP 2001 Recorded

## 2022-02-15 NOTE — NURSING NOTE
Rapid Strep POC Entered On:  10/22/2021 10:31 AM CDT    Performed On:  10/5/2021 10:31 AM CDT by Brii Johnson               Rapid Strep POC   Rapid Strep POC :   Negative   POC Test Comments :   Performed at [facility]   Brii Johnson - 10/22/2021 10:31 AM CDT

## 2022-02-15 NOTE — TELEPHONE ENCOUNTER
---------------------  From: Catrachita Clarke CMA (eRx Pool ([id number]))   To: [facility] Message Pool ([id number]);     Sent: 11/22/2019 4:16:31 PM CST  Subject: FW: Medication Management   Due Date/Time: 11/23/2019 1:54:00 PM CST     Medication Refill needing approval    PCP:   EILEEN    Medication:   Vyvanse  Last Filled:  10/22/19   Quantity:  30  Refills:  0  CSA on file?   9/13/19     Date of last office visit and reason:   10/9/19; UTI  Date of last labs pertaining to condition:  5/15/19    Return to Clinic order placed?  overdue for px (11/26/19 appt scheduled)      ------------------------------------------  From: [facility] [place]  To: Jazmin Vargas MD  Sent: November 22, 2019 1:54:29 PM CST  Subject: Medication Management  Due: November 23, 2019 1:54:29 PM CST    ** On Hold Pending Signature **  Drug: lisdexamfetamine (Vyvanse 30 mg oral capsule)  TAKE ONE CAPSULE BY MOUTH ONCE DAILY EVERY MORNING  Quantity: 30 unknown unit  Days Supply: 0  Refills: 0  Substitutions Allowed  Notes from Pharmacy:     Dispensed Drug: lisdexamfetamine (Vyvanse 30 mg oral capsule)  TAKE ONE CAPSULE BY MOUTH ONCE DAILY EVERY MORNING  Quantity: 30 unknown unit  Days Supply: 0  Refills: 0  Substitutions Allowed  Notes from Pharmacy:   ---------------------------------------------------------------  From: Sherita Fernandez CMA ([facility] Message Pool ([id number]_[facility]))   To: Jazmin Vargas MD;     Sent: 11/22/2019 4:55:15 PM CST  Subject: FW: Medication Management   Due Date/Time: 11/23/2019 1:54:00 PM CST     Has appt 11/26.

## 2022-02-15 NOTE — PROGRESS NOTES
Chief Complaint    rx refill- adderall. Birth control questions. Parents have signed authorization for patient to be seen under 18 years of age.  History of Present Illness       patient present to clinic today for follow up of ADD/ADHD.  pt reports current medication is working well, denies adverse side effects, no headache, chest pain, nausea,   would like to continue with current dose.   she has had a slight increase in insomnia, also reports her syncopal sx were bad today in the shower, she has not heard from cards for referral, she was able to sit befor she passed out      The medication improves  ability to function at work and at home.       denies insomnia or headaches.       [place] PMDP searched, no red flags noted.  Urine drug screen is up to date.      Review of systems is negative except as per HPI including:  no fevers, chills, sore throat, runny nose, nausea, vomiting, constipation, diarrhea, rash or new skin lesions, chest pain, palpitations, slurred speech, new paresthesia, shortness of breath or wheeze.      Exam:   also see vitals below      General: alert and oriented ×3 no acute distress.      HEENT: pupils are equal round and reactive to light extraocular motion is intact. Normocephalic and atraumatic.       Hearing is grossly normal and there is no otorrhea.       Nares are patent there is no rhinorrhea.       Mucous membranes are moist and pink.      Chest: has bilateral rise with no increased work of breathing.      Cardiovascular: normal perfusion and brisk capillary refill.      Musculoskeletal: no gross focal abnormalities and normal gait.      Neuro: no gross focal abnormalities and memory seems intact.      Psychiatric: speech is clear and coherent and fluent. Patient dressed appropriately for the weather. Mood is appropriate and affect is full.                     Discussed with patient to return to clinic if symptoms worsen or do not improve and for next Annual exam.         ADD         Discussed with use of stimulant will require close monitoring.   Most common side effects are anorexia (80%), sleep disturbance  and weight loss.  Tic behaviors are more unusual but can occur.  Discussed that at times dose adjustment needs to be made and there is a possibility of addiction behavior.        Given prescription for this month and prescription for fill in 1 month        Can call for prescription in 2 month      Follow up in 3 months      15 minutes spent with patient in direct face to face contact, > 50% of time spent counseling and coordinating care.          Physical Exam   Vitals & Measurements    T: 96.4   F (Tympanic)  HR: 92(Peripheral)  BP: 112/66  SpO2: 98%     WT: 141.6 lb   Assessment/Plan       ADD (attention deficit disorder) (F98.8)          changed pharmacy to [facility] due to [facility] closeing         Ordered:          amphetamine-dextroamphetamine, = 1 cap(s) ( 20 mg ), PO, qAM, Instructions: do not fill until 02/25/2019, # 30 cap(s), 0 Refill(s), Type: Maintenance, Pharmacy: [id number], 1 cap(s) Oral qam,Instr:do not fill until 02/25/2019, (Ordered)          Return to Clinic (Request), Return in 3 weeks          Return to Clinic (Request), RFV: follow up adhd, Return in 3 months                Orthostatic hypertension (I10)         Ordered:          Referral (Request), 01/29/19 16:12:00 CST, Referred to: Cardiology, Syncope  Orthostatic hypertension                Syncope (R55)         Ordered:          Referral (Request), 01/29/19 16:12:00 CST, Referred to: Cardiology, Syncope  Orthostatic hypertension          Return to Clinic (Request), RFV: follow up concussion and syncope, Return in 1 week                Orders:         amphetamine-dextroamphetamine, = 1 cap(s) ( 20 mg ), Oral, qam, # 30 cap(s), 0 Refill(s), Type: Soft Stop, Pharmacy: [id number], 1 cap(s) Oral qam, (Ordered)  Patient Information     Name:SENG SCHRADER      Address:      [id number]  [place] [address]     Sex:Female     YOB: 2000     [phone]     Emergency Contact:RAGHU SCHRADER     MRN:[id number]     FIN:[id number]     Location:[facility]     Date of Service:01/29/2019      Primary Care Physician:       Jazmin Vargas MD, [phone]      Attending Physician:       Jazmin Vargas MD, [phone]  Problem List/Past Medical History    Ongoing     ADD (attention deficit disorder)       Comments: 'Possible'     Concussion     Needle phobia     Syncope    Historical     Viral pharyngitis  Procedure/Surgical History     No previous procedures        Medications     Loestrin 21 1/20 oral tablet: 1 tab(s), PO, Daily, 84 tab(s), 3 Refill(s).     Adderall XR 20 mg oral capsule, extended release: 20 mg, 1 cap(s), PO, qAM, 30 cap(s), 0 Refill(s).     amphetamine-dextroamphetamine 20 mg oral capsule, extended release: See Instructions, Take 1 capsule by mouth every morning, 30 cap(s), 0 Refill(s).     amphetamine-dextroamphetamine 20 mg oral capsule, extended release: See Instructions, Take 1 capsule by mouth every morning, do not fill until 12/7/2018, 30 cap(s), 0 Refill(s).     amphetamine-dextroamphetamine 20 mg oral capsule, extended release: 20 mg, 1 cap(s), Oral, qam, 30 cap(s), 0 Refill(s).     Adderall XR 20 mg oral capsule, extended release: 20 mg, 1 cap(s), PO, qAM, do not fill until 02/25/2019, 30 cap(s), 0 Refill(s).     amphetamine-dextroamphetamine 20 mg oral capsule, extended release: 20 mg, 1 cap(s), Oral, qam, 30 cap(s), 0 Refill(s).          Allergies    No Known Medication Allergies  Social History    Smoking Status - 01/29/2019     Never smoker     Alcohol      Never, 09/05/2018     Home and Environment      Lives with Father, Stepmother. Substance abuse in household: No. Smoker in household: No. Risks in environment: Does not wear helmet., 08/11/2015     Sexual      Sexually active: No., 08/11/2015     Substance Abuse       Never, 08/11/2015     Tobacco      Never, Household tobacco concerns: No., 05/13/2011  Family History    Gastro-esophageal reflux disease: Mother.    Suicide..: Mother.  Immunizations      Vaccine Date Status      meningococcal conjugate vaccine 11/16/2018 Given      Hep A, pediatric/adolescent 11/16/2018 Given      influenza virus vaccine, inactivated 11/09/2018 Given      human papillomavirus vaccine 06/03/2014 Given      human papillomavirus vaccine 01/17/2014 Given      DTaP-IPV 01/17/2014 Recorded      tetanus/diphth/pertuss (Tdap) adult/adol 01/17/2014 Recorded      influenza (LAIV) 11/11/2013 Given      human papillomavirus vaccine 11/11/2013 Given      meningococcal conjugate vaccine 07/19/2012 Given      tetanus/diphth/pertuss (Tdap) adult/adol 07/19/2012 Given      influenza 09/22/2011 Given      influenza virus vaccine, inactivated 10/26/2010 Given      influenza, H1N1, live 01/18/2010 Recorded      influenza, H1N1, live 12/15/2009 Recorded      varicella 06/01/2009 Recorded      DTaP 07/20/2006 Recorded      MMR (measles/mumps/rubella) 07/20/2006 Recorded      IPV 07/20/2006 Recorded      DTaP 10/03/2002 Recorded      Hep B-Hib 10/03/2002 Recorded      varicella 07/11/2002 Recorded      MMR (measles/mumps/rubella) 07/11/2002 Recorded      pneumococcal (PCV7) 03/28/2002 Recorded      DTaP 01/03/2002 Recorded      pneumococcal (PCV7) 01/03/2002 Recorded      IPV 01/03/2002 Recorded      DTaP 2001 Recorded      Hep B-Hib 2001 Recorded      IPV 2001 Recorded      DTaP 2001 Recorded      pneumococcal (PCV7) 2001 Recorded      Hep B-Hib 2001 Recorded      IPV 2001 Recorded  Lab Results       Lab Results (Last 4 results within 90 days)        Sodium Level: 140 mmol/L (11/16/18 15:53:00)       Potassium Level: 4.5 mmol/L (11/16/18 15:53:00)       Chloride Level: 107 mmol/L (11/16/18 15:53:00)       CO2 Level: 27 mmol/L (11/16/18 15:53:00)       Glucose Level: 79 mg/dL  (11/16/18 15:53:00)       BUN: 10 mg/dL (11/16/18 15:53:00)       Creatinine Level: 0.62 mg/dL (11/16/18 15:53:00)       BUN/Creat Ratio: NOT APPLICABLE (11/16/18 15:53:00)       Calcium Level: 9.6 mg/dL (11/16/18 15:53:00)       Phosphorus Level: 4.4 mg/dL (11/16/18 15:53:00)       Magnesium Level: 2.1 mg/dL (11/16/18 15:53:00)       TSH: 0.98 mIU/L (11/16/18 15:53:00)       WBC: 4.9 (11/16/18 15:53:00)       RBC: 4.59 (11/16/18 15:53:00)       Hgb: 13.6 gm/dL (11/16/18 15:53:00)       Hct: 39.7 % (11/16/18 15:53:00)       MCV: 86.5 fL (11/16/18 15:53:00)       MCH: 29.6 pg (11/16/18 15:53:00)       MCHC: 34.3 gm/dL (11/16/18 15:53:00)       RDW: 13.1 % (11/16/18 15:53:00)       Platelet: 260 (11/16/18 15:53:00)       MPV: 10.2 fL (11/16/18 15:53:00)       Lymphocytes: 45.3 % (11/16/18 15:53:00)       Abs Lymphocytes: 2220 (11/16/18 15:53:00)       Neutrophils: 42.8 % (11/16/18 15:53:00)       Abs Neutrophils: 2097 (11/16/18 15:53:00)       Monocytes: 8.2 % (11/16/18 15:53:00)       Abs Monocytes: 402 (11/16/18 15:53:00)       Eosinophils: 3.1 % (11/16/18 15:53:00)       Abs Eosinophils: 152 (11/16/18 15:53:00)       Basophils: 0.6 % (11/16/18 15:53:00)       Abs Basophils: 29 (11/16/18 15:53:00)

## 2022-02-15 NOTE — PROGRESS NOTES
Chief Complaint    c/o ongoing throat pain and drainage from tonsils, negative for strep last time she was here. States she was prescribed an antibiotic but she never picked it up because she thought she was getting better.  History of Present Illness       Patient is a 21-year-old female who comes in complaining of a sore throat mostly on her right side.  Is been going on for 5 days.  She has a headache and myalgias as well as rhinitis and congestion and it mild dry cough.  No nausea.  No fevers or chills.       About 6 weeks ago she had a sore throat on the right side of her throat and had some black pus that she gagged on.  She had a rapid strep test that was negative at that time, October 5.  She was prescribed antibiotics for tonsillitis but she never picked them up and took them.  Her symptoms resolved on their own.       She has been taking Tylenol and ibuprofen as needed but has not taken very much of it.  Review of Systems       Negative except as listed in HPI  Physical Exam   Vitals & Measurements    T: 97.9  F (Tympanic)  HR: 76 (Peripheral)  BP: 128/70     HT: 64 in  HT: 64 in  WT: 153.9 lb  BMI: 26.41        Vitals noted and within normal limits.       Patient is alert, oriented and in no acute distress.       Eyes: conjunctiva not injected.       Ears: canals patent, TMs intact, no erythema and no bulging       Mouth: mucous membranes pink and moist, pharynx is mildly erythematous and right-sided tonsillar tissue is minimally visible and left side is not visible.  There is no exudate.       Neck is supple with positive superior anterior cervical lymphadenopathy right greater than left       Heart: regular rate and rhythm with no murmur       Lungs: clear to auscultation bilaterally       Rapid strep test negative  Assessment/Plan       Cough (R05.9)         Ordered:          SARS-CoV-2 RNA (COVID-19), Qualitative NAAT (Request), Sore throat  Rhinitis  Cough                Rhinitis (J31.0)          Ordered:          SARS-CoV-2 RNA (COVID-19), Qualitative NAAT (Request), Sore throat  Rhinitis  Cough                Sore throat (J02.9)         Covid swab pending         Believe this represents of viral URI.  She had a period of wellness in between these 2 episodes of sore throat.        Ibuprofen/Advil 200mg tablets.  Take 3 tablets three times per day with food for 5-7 days.        Acetaminophen/tylenol 500mg tabs.  Take 1-2 tablets every 4-6 hours as needed.        Follow-up if not improving as anticipated         Ordered:          Rapid Strep Test (Request), Priority: STAT, Sore throat          SARS-CoV-2 RNA (COVID-19), Qualitative NAAT (Request), Sore throat  Rhinitis  Cough          Streptococcus, group a culture* ([facility]), Specimen Type: Throat, Collection Date: 11/15/21 17:38:00 CST           Patient Information     Name:SENG SCHRADER      Address:      [address] [id number]     Sex:Female     YOB: 2000     [phone]     Emergency Contact:RAGHU SCHRADER     MRN:[id number]     FIN:[id number]     Location:[facility]     Date of Service:11/15/2021      Primary Care Physician:       Jazmin Vargas MD, [phone]      Attending Physician:       Nena Rodriguez MD, [phone]  Problem List/Past Medical History    Ongoing     ADHD (attention deficit hyperactivity disorder)     ADHD (attention deficit hyperactivity disorder), inattentive type     Chronic post-traumatic stress disorder (PTSD)     Concussion     History of COVID-19     Needle phobia     Syncope     Uses oral contraception    Historical     Viral pharyngitis  Procedure/Surgical History     No previous procedures  Medications    Adderall XR 25 mg oral capsule, extended release, 25 mg= 1 cap(s), Oral, qam    Adderall XR 25 mg oral capsule, extended release, 25 mg= 1 cap(s), Oral, qam  Allergies    Vyvanse (Irritability)    Concerta (Irritability, Anxiety)    methylphenidate  (Irritability, Anxiety)  Social History    Smoking Status     Never smoker     Alcohol      Never     Electronic Cigarette/Vaping      Electronic Cigarette Use: Never.     Home/Environment      Lives with Father, Stepmother. Substance abuse in household: No. Smoker in household: No. Risks in environment: Does not wear helmet.     Sexual      Sexually active: No.     Substance Abuse      Never     Tobacco      Never (less than 100 in lifetime)  Family History    Gastro-esophageal reflux disease: Mother.    Suicide..: Mother.  Lab Results       Lab Results (Last 4 results within 90 days)        Rapid Strep POC: Negative (10/05/21 10:31:00)       Culture Strep A: See comment (10/05/21 15:50:00)       POC Test Comments: Performed at [facility] (10/05/21 10:31:00)  Immunizations       Scheduled Immunizations       Dose Date(s)       DTaP-IPV       01/17/2014       Hep A, pediatric/adolescent       11/16/2018       Hep B-Hib       2001, 2001, 10/03/2002       human papillomavirus vaccine       11/11/2013, 01/17/2014, 06/03/2014       influenza (LAIV)       11/11/2013       influenza virus vaccine, inactivated       11/09/2018, 11/18/2005, 01/12/2006, 11/17/2008       IPV       2001, 2001, 01/03/2002, 07/20/2006       meningococcal conjugate vaccine       07/19/2012, 11/16/2018       MMR (measles/mumps/rubella)       07/11/2002, 07/20/2006       tetanus/diphth/pertuss (Tdap) adult/adol       07/19/2012, 01/17/2014       varicella       07/11/2002, 06/01/2009       Other Immunizations               influenza       09/22/2011       influenza virus vaccine, inactivated       10/26/2010       pneumococcal (PCV7)       2001, 01/03/2002, 03/28/2002       DTaP       2001, 2001, 01/03/2002, 10/03/2002, 07/20/2006       influenza, H1N1, live       12/15/2009, 01/18/2010

## 2022-02-15 NOTE — NURSING NOTE
Comprehensive Intake Entered On:  5/15/2019 10:59 AM CDT    Performed On:  5/15/2019 10:52 AM CDT by Deanne Schmitt CMA               Summary   Chief Complaint :   ADHD medication check   Menstrual Status :   Menarcheal   Weight Measured :   135 lb(Converted to: 135 lb 0 oz, 61.23 kg)    Height/Length Estimated :   64 in(Converted to: 5 ft 4 in, 162.56 cm)    Systolic Blood Pressure :   106 mmHg   Diastolic Blood Pressure :   70 mmHg   Mean Arterial Pressure :   82 mmHg   Peripheral Pulse Rate :   74 bpm   BP Site :   Right arm   BP Method :   Manual   Temperature Tympanic :   98.9 DegF(Converted to: 37.2 DegC)    Oxygen Saturation :   99 %   Deanne Schmitt CMA - 5/15/2019 10:52 AM CDT   Health Status   Allergies Verified? :   Yes   Medication History Verified? :   Yes   Medical History Verified? :   Yes   Pre-Visit Planning Status :   Completed   Tobacco Use? :   Never smoker   Deanne Schmitt CMA - 5/15/2019 10:52 AM CDT   Consents   Consent for Immunization Exchange :   Consent Granted   Consent for Immunizations to Providers :   Consent Granted   Deanne Schmitt CMA - 5/15/2019 10:52 AM CDT   Meds / Allergies   (As Of: 5/15/2019 10:59:04 AM CDT)   Allergies (Active)   No Known Medication Allergies  Estimated Onset Date:   Unspecified ; Created By:   Deanne Schmitt CMA; Reaction Status:   Active ; Category:   Drug ; Substance:   No Known Medication Allergies ; Type:   Allergy ; Updated By:   Deanne Schmitt CMA; Reviewed Date:   5/15/2019 10:54 AM CDT        Medication List   (As Of: 5/15/2019 10:59:04 AM CDT)   Prescription/Discharge Order    amphetamine-dextroamphetamine  :   amphetamine-dextroamphetamine ; Status:   Completed ; Ordered As Mnemonic:   Adderall XR 20 mg oral capsule, extended release ; Simple Display Line:   20 mg, 1 cap(s), PO, qAM, do not fill until 02/25/2019, 30 cap(s), 0 Refill(s) ; Ordering Provider:   Jazmin Vargas MD; Catalog Code:   amphetamine-dextroamphetamine ; Order Dt/Tm:    1/29/2019 4:05:04 PM          amphetamine-dextroamphetamine  :   amphetamine-dextroamphetamine ; Status:   Completed ; Ordered As Mnemonic:   Adderall XR 20 mg oral capsule, extended release ; Simple Display Line:   20 mg, 1 cap(s), PO, qAM, 30 cap(s), 0 Refill(s) ; Ordering Provider:   Jazmin Vargas MD; Catalog Code:   amphetamine-dextroamphetamine ; Order Dt/Tm:   10/2/2018 2:18:01 PM          amphetamine-dextroamphetamine  :   amphetamine-dextroamphetamine ; Status:   Prescribed ; Ordered As Mnemonic:   amphetamine-dextroamphetamine 20 mg oral capsule, extended release ; Simple Display Line:   20 mg, 1 cap(s), Oral, qam, 30 cap(s), 0 Refill(s) ; Ordering Provider:   Bryant Boyer MD; Catalog Code:   amphetamine-dextroamphetamine ; Order Dt/Tm:   4/11/2019 4:20:45 PM          amphetamine-dextroamphetamine  :   amphetamine-dextroamphetamine ; Status:   Completed ; Ordered As Mnemonic:   amphetamine-dextroamphetamine 20 mg oral capsule, extended release ; Simple Display Line:   20 mg, 1 cap(s), Oral, qam, 30 cap(s), 0 Refill(s) ; Ordering Provider:   Jazmin Vargas MD; Catalog Code:   amphetamine-dextroamphetamine ; Order Dt/Tm:   12/14/2018 3:47:13 PM          amphetamine-dextroamphetamine  :   amphetamine-dextroamphetamine ; Status:   Completed ; Ordered As Mnemonic:   amphetamine-dextroamphetamine 20 mg oral capsule, extended release ; Simple Display Line:   See Instructions, Take 1 capsule by mouth every morning, do not fill until 12/7/2018, 30 cap(s), 0 Refill(s) ; Ordering Provider:   Jazmin aVrgas MD; Catalog Code:   amphetamine-dextroamphetamine ; Order Dt/Tm:   11/9/2018 2:15:30 PM          amphetamine-dextroamphetamine  :   amphetamine-dextroamphetamine ; Status:   Completed ; Ordered As Mnemonic:   amphetamine-dextroamphetamine 20 mg oral capsule, extended release ; Simple Display Line:   See Instructions, Take 1 capsule by mouth every morning, 30 cap(s), 0 Refill(s) ; Ordering Provider:    Garima Chavarria; Catalog Code:   amphetamine-dextroamphetamine ; Order Dt/Tm:   11/8/2018 1:50:52 PM          ethinyl estradiol-norethindrone  :   ethinyl estradiol-norethindrone ; Status:   Prescribed ; Ordered As Mnemonic:   Loestrin 21 1/20 oral tablet ; Simple Display Line:   1 tab(s), PO, Daily, 84 tab(s), 3 Refill(s) ; Ordering Provider:   Jazmin Vargas MD; Catalog Code:   ethinyl estradiol-norethindrone ; Order Dt/Tm:   8/30/2018 6:03:20 PM

## 2022-02-15 NOTE — TELEPHONE ENCOUNTER
---------------------  From: Lucía Berger CMA   Sent: 9/15/2020 12:01:24 PM CDT  Subject: Covid Test Results     Called and left a message for pt on identified voicemail notifying her that the covid results came back negative. Advised pt to call back if still having symptoms and not improving or has any questions.

## 2022-02-15 NOTE — PROGRESS NOTES
Patient Information     Name:SENG SCHRADER      Address:      [id number] [place] [id number]     Sex:Female     YOB: 2000     [phone]     Emergency Contact:RAGHU SCHRADER     MRN:[id number]     FIN:[id number]     Location:[facility]     Date of Service:03/24/2020      Primary Care Physician:       Jazmin Vargas MD, [phone]      Attending Physician:       Jazmin Vargas MD, [phone]   1722hrs-1730hrs  Subjective      Today's visit was conducted via telephone due to the COVID-19 pandemic.       HPI patient reports that she is tolerating the immediate release better than the extended release methylphenidate but does not think that 10 mg is quite enough.  She cannot recall for sure if the tablets are scored but would like to try taking 1-1/2 tablets twice daily.  I did review the [place] PDMP and there was no red flag activity noticed.  Will increase from 10 mg twice daily to 15 mg twice daily and follow-up in approximately 1 month.      ROS 10 point ROS is neg except as per HPI      Discussed:      Contact clinic if sx worsen or do not improve.       Also discussed methods to reduce risks of Covid-19 including social isolation and avoid touching face and frequent, adequate hand washing.  If you develop upper respiratory symptoms then call the clinic or the ED to discuss whether or not you should come in for further evaluation and treatment.  Assessment/Plan       ADHD (attention deficit hyperactivity disorder) (F90.0)         Ordered:          71800 physician telephone evaluation 5-10 min (Charge), Quantity: 1, ADHD (attention deficit hyperactivity disorder)                Orders:         methylphenidate, = 1.5 tab(s) ( 15 mg ), Oral, bid, # 90 tab(s), 0 Refill(s), Type: Maintenance, Pharmacy: [facility], 1.5 tab(s) Oral bid, (Ordered)

## 2022-02-15 NOTE — PROGRESS NOTES
Patient:   SENG SHCRADER            MRN: [id number]            FIN: [id number]               Age:   21 years     Sex:  Female     :  2000   Associated Diagnoses:   Sore throat   Author:   Guillaume Espinoza MD      Visit Information      Date of Service: 10/05/2021 03:20 pm  Performing Location: [facility]  Encounter#: [id number]      Primary Care Provider (PCP):  Jazmin Vargas MD    NPI# [id number]      Referring Provider:  Guillaume Espinoza MD    NPI# [id number]      Chief Complaint   10/5/2021 3:30 PM CDT    R side ST with bleeding and discharge. Sx started 6 days ago. Also would like Adderall filled.        History of Present Illness   Patient for sore throat she developed sore throat over the last day primarily right-sided she noticed some pus and redness on the right side as well. No fevers chills or sweats some tenderness in her right neck         Review of Systems   Constitutional:  Negative except as documented in history of present illness.    Ear/Nose/Mouth/Throat:  Negative except as documented in history of present illness.    Respiratory:  Negative.    Neurologic:  Negative.       Health Status   Allergies:    Nonallergic Reactions (Selected)  Moderate  Vyvanse (Irritability)  Severity Not Documented  Concerta (Anxiety and irritability)  Methylphenidate (Anxiety and irritability)   Medications:  (Selected)   Prescriptions  Prescribed  Adderall XR 25 mg oral capsule, extended release: = 1 cap(s) ( 25 mg ), Oral, qam, # 30 cap(s), 0 Refill(s), Type: Maintenance, Pharmacy: [facility], 1 cap(s) Oral qam, 64, in, 21 9:16:00 CST, Height Measured, 162, lb, 21 9:48:00 CDT, Weight Measured  Adderall XR 25 mg oral capsule, extended release: = 1 cap(s) ( 25 mg ), Oral, qam, Instructions: may fill in 28 days, # 30 cap(s), 0 Refill(s), Type: Maintenance, Pharmacy: [facility] [place], 1 cap(s) Oral qam,Instr:may fill in 28 days, 64, in, 21  9:16:00 CST, Height Measured, 162,...  Augmentin 875 mg-125 mg oral tablet: = 1 tab(s), Oral, q12 hrs, x 10 day(s), # 20 tab(s), 0 Refill(s), Type: Acute, Pharmacy: [facility], 1 tab(s) Oral q12 hrs,x10 day(s), 64, in, 21 9:16:00 CST, Height Measured, 155, lb, 10/05/21 15:30:00 CDT, Weight Measured   Problem list:    All Problems (Selected)  Uses oral contraception / SNOMED CT 90734521 / Confirmed  Concussion / SNOMED CT 2285993323 / Confirmed  Needle phobia / SNOMED CT 479561840 / Confirmed  History of COVID-19 / SNOMED CT 8856504081 / Confirmed  Syncope / SNOMED CT 100958203 / Confirmed  Chronic post-traumatic stress disorder (PTSD) / SNOMED CT 611943166 / Confirmed  ADHD (attention deficit hyperactivity disorder) / SNOMED CT 18136149 / Confirmed  ADHD (attention deficit hyperactivity disorder), inattentive type / SNOMED CT 05645146 / Confirmed      Histories   Past Medical History:    Resolved  Viral pharyngitis ([id number]):  Resolved.   Family History:    Gastro-esophageal reflux disease  Mother ()  Suicide..  Mother ()     Procedure history:    No previous procedures.   Social History:        Electronic Cigarette/Vaping Assessment            Electronic Cigarette Use: Never.      Alcohol Assessment            Never      Tobacco Assessment            Never, Household tobacco concerns: No.            Never (less than 100 in lifetime)      Substance Abuse Assessment            Never      Home and Environment Assessment            Lives with Father, Stepmother.  Substance abuse in household: No.  Smoker in household: No.  Risks in               environment: Does not wear helmet.      Sexual Assessment            Sexually active: No.        Physical Examination   Vital Signs   10/5/2021 3:30 PM CDT Temperature Tympanic 98.5 DegF    Peripheral Pulse Rate 91 bpm    Pulse Site Radial artery    HR Method Electronic    Systolic Blood Pressure 119 mmHg    Diastolic Blood Pressure 79  mmHg    Mean Arterial Pressure 92 mmHg    BP Site Right arm    BP Method Electronic      Measurements from flowsheet : Measurements   10/5/2021 3:30 PM CDT Height/Length Estimated 64 in    Weight Measured - Standard 155 lb      General:  Alert and oriented, No acute distress.    HENT:  Patient has mild pharyngeal redness with swelling primarily on the right side she has anterior cervical adenopathy primarily on the right side  .    Neurologic:  Alert, Oriented.       Impression and Plan   Diagnosis     Sore throat ([id number] J02.9).     Plan:  Patient with right tonsillar swelling likely abscess will treat with Augmentin follow-up Friday if not doing better sooner if worse  .

## 2022-02-15 NOTE — TELEPHONE ENCOUNTER
---------------------  From: Bill Shafer   To: Phone Messages Pool ([id number]_[facility]);     Cc: Steven De Oliveira MD;      Sent: 12/17/2020 8:52:07 AM CST  Subject: Covid results     Tried calling pt about her POSITIVE covid results but no answer. [facility] for further instructions.Pt calling back at [phone] LM in . Called back at [phone] and results given. Explained to remain quarantined until she hears from public health. She agrees.

## 2022-02-15 NOTE — PROGRESS NOTES
Chief Complaint    c/o sore throat, fatigue and shortness of breath present since Wednesday. Verbal consent given for video visit.  History of Present Illness      Today's visit was conducted via telemedicine, video, from my clinic office to patient's home,  due to the COVID-19 pandemic.  [id number]      Patient consent, as follows, for telemedicine visit was obtained.   You have been scheduled for a telemedicine visit, which is a billable service.  This is a replacement for a face-to-face visit that is being recommended at this time to help keep our patient safe.  If, during our visit , we decide that you need a face-to-face visit, this visit will be canceled and you will be rescheduled to come into the clinic for a face to face appointment.  Can we proceed with a telemedicine visit?       HPI      Patient reports that Wednesday she started to feel sick.  She is having subjective fevers, cough, runny nose, head congestion.  She has had loss of sense of smell but she thinks that this is related to the sinus congestion.  She is got body aches no nausea or vomiting or diarrhea.  She would like to get coronavirus testing done for SARS 2.  She is aware that she will need to remain in quarantine until the test results come back.  If she is positive then the health department will be contacting her.  Her tests of been recently taking about 3 days to get results back but I cannot make any guarantees.  There are some places in the [facility] where you can get a rapid test done I am not sure if they are a cash only service.      She also has a history of ADHD and needs a refill of her medication.  She understands that she is not supposed to go pick this up for herself while she is on quarantine.      ROS 10 point ROS is neg except as per HPI      Review of systems is negative except as per HPI including:  no fevers, chills, sore throat, runny nose, nausea, vomiting, constipation, diarrhea, rash or new skin lesions, chest  pain, palpitations, slurred speech, new paresthesia, shortness of breath or wheeze.      Exam:      General: alert and oriented ×3 no acute distress.  Mildly ill-appearing.  Able to talk in complete sentences.  She does sound a little congested.      HEENT: pupils are equal round and reactive to light extraocular motion is intact. Normocephalic and atraumatic.       Hearing is grossly normal and there is no otorrhea.       Nares are patent there is no rhinorrhea.       Mucous membranes are moist and pink.      Chest: has bilateral rise with no increased work of breathing.      Cardiovascular: normal perfusion and brisk capillary refill.      Musculoskeletal: no gross focal abnormalities and normal gait.      Neuro: no gross focal abnormalities and memory seems intact.      Psychiatric: speech is clear and coherent and fluent. Patient dressed appropriately for the weather. Mood is appropriate and affect is full.  judgement and insight are normal, no A/V hallucinations, no S/H ideation.  thought process linear                     Discussed with patient to return to clinic if symptoms worsen or do not improve      Discussed:      Contact clinic if sx worsen or do not improve.       Also discussed methods to reduce risks of Covid-19 including social isolation and avoid touching face and frequent, adequate hand washing.  If you develop upper respiratory symptoms then call the clinic or the ED to discuss whether or not you should come in for further evaluation and treatment.  Physical Exam   Vitals & Measurements    HT: 64 in   Assessment/Plan       ADHD (attention deficit hyperactivity disorder), combined type (F90.2)         Ordered:          methylphenidate, = 1.5 tab(s) ( 15 mg ), Oral, bid, # 90 tab(s), 0 Refill(s), Type: Maintenance, Pharmacy: [facility], 1.5 tab(s) Oral bid, 64, in, 12/09/19 11:15:00 CST, Height Measured, 163.4, lb, 04/29/20 11:29:00 CDT, Weight Measured, (Ordered)          [address]  office outpatient visit 15 minutes (Charge), Quantity: 1, Suspected COVID-19 virus infection  ADHD (attention deficit hyperactivity disorder), combined type                Encounter for screening examination for infectious disease (Z11.9)         Ordered:          SARS-CoV-2 RNA (COVID-19), Qualitative NAAT* ([facility]), Specimen Type: Nasopharyngeal Swab, Collection Date: 09/11/20 13:14:00 CDT                Suspected COVID-19 virus infection (R68.89)         Ordered:          60622 office outpatient visit 15 minutes (Charge), Quantity: 1, Suspected COVID-19 virus infection  ADHD (attention deficit hyperactivity disorder), combined type               Will have staff contact patient to arrange for drive up testing today if her symptoms acutely worsen she has difficulty breathing feels lightheaded then she should go to the emergency room for further evaluation and treatment she not unable to drive there safely she should call for 911.  Also renewed her methylphenidate.  Patient Information     Name:SENG SCHRADER      Address:      [address] [id number]     Sex:Female     YOB: 2000     [phone]     Emergency Contact:RAGHU SCHRADER     MRN:[id number]     FIN:[id number]     Location:[facility]     Date of Service:09/11/2020      Primary Care Physician:       Jazmin Vargas MD, [phone]      Attending Physician:       Jazmin Vargas MD, [phone]  Problem List/Past Medical History    Ongoing     ADD (attention deficit disorder)       Comments: 'Possible'     ADHD (attention deficit hyperactivity disorder)     ADHD (attention deficit hyperactivity disorder), inattentive type     Concussion     Needle phobia     Syncope     Uses oral contraception    Historical     Viral pharyngitis  Procedure/Surgical History     No previous procedures  Medications    Loestrin 21 1/20 oral tablet, 1 tab(s), Oral, daily, 3 refills    methylphenidate 10 mg  oral tablet, 15 mg= 1.5 tab(s), Oral, bid    methylphenidate 10 mg oral tablet, 15 mg= 1.5 tab(s), Oral, bid  Allergies    Vyvanse (Irritability)  Social History    Smoking Status     Never smoker     Alcohol      Never     Home/Environment      Lives with Father, Stepmother. Substance abuse in household: No. Smoker in household: No. Risks in environment: Does not wear helmet.     Sexual      Sexually active: No.     Substance Abuse      Never     Tobacco      Never, Household tobacco concerns: No.  Family History    Gastro-esophageal reflux disease: Mother.    Suicide..: Mother.  Immunizations      Vaccine Date Status          meningococcal conjugate vaccine 11/16/2018 Given          Hep A, pediatric/adolescent 11/16/2018 Given          influenza virus vaccine, inactivated 11/09/2018 Given          human papillomavirus vaccine 06/03/2014 Given          human papillomavirus vaccine 01/17/2014 Given          DTaP-IPV 01/17/2014 Recorded          tetanus/diphth/pertuss (Tdap) adult/adol 01/17/2014 Recorded          influenza (LAIV) 11/11/2013 Given          human papillomavirus vaccine 11/11/2013 Given          meningococcal conjugate vaccine 07/19/2012 Given          tetanus/diphth/pertuss (Tdap) adult/adol 07/19/2012 Given          influenza 09/22/2011 Given          influenza virus vaccine, inactivated 10/26/2010 Given          influenza, H1N1, live 01/18/2010 Recorded          influenza, H1N1, live 12/15/2009 Recorded          varicella 06/01/2009 Recorded          DTaP 07/20/2006 Recorded          MMR (measles/mumps/rubella) 07/20/2006 Recorded          IPV 07/20/2006 Recorded          DTaP 10/03/2002 Recorded          Hep B-Hib 10/03/2002 Recorded          varicella 07/11/2002 Recorded          MMR (measles/mumps/rubella) 07/11/2002 Recorded          pneumococcal (PCV7) 03/28/2002 Recorded          DTaP 01/03/2002 Recorded          pneumococcal (PCV7) 01/03/2002 Recorded          IPV 01/03/2002 Recorded           DTaP 2001 Recorded          Hep B-Hib 2001 Recorded          IPV 2001 Recorded          DTaP 2001 Recorded          pneumococcal (PCV7) 2001 Recorded          Hep B-Hib 2001 Recorded          IPV 2001 Recorded

## 2022-02-15 NOTE — PROGRESS NOTES
Chief Complaint    ADHD med check follow up. 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      needs new refill of adhd meds      difficult relationship with her father, mom  from suicide when pt was 12, pt witnessed her mom's suicide  Physical Exam   Vitals & Measurements    HR: 71 (Peripheral)  RR: 16  BP: 100/80  SpO2: 98%     HT: 64 in  WT: 162 lb   Assessment/Plan       ADHD (attention deficit hyperactivity disorder), inattentive type (F90.0)         renewed adderall, will need follow up appt in 4 months         Ordered:          amphetamine-dextroamphetamine, = 1 cap(s) ( 25 mg ), Oral, qam, Instructions: may fill in 28 days, # 30 cap(s), 0 Refill(s), Type: Maintenance, [facility], 1 cap(s) Oral qam,Instr:may fill in 28 days, 64, in, 21 9:16:00 CST, Height Measured, 162,..., (Ordered)          amphetamine-dextroamphetamine, = 1 cap(s) ( 25 mg ), Oral, qam, # 30 cap(s), 0 Refill(s), Type: Maintenance, [facility], 1 cap(s) Oral qam, 64, in, 21 9:16:00 CST, Height Measured, 162, lb, 21 9:48:00 CDT, Weight Measured, (Ordered)                Chronic post-traumatic stress disorder (PTSD) (F43.12)         Ordered:          Referral (Request), 21 10:46:00 CDT, Referred to: Behavioral Health, Referred to: Kirsten Edmonds, Chronic post-traumatic stress disorder (PTSD)  Family problems                Family problems (Z63.9)         Ordered:          Referral (Request), 21 10:46:00 CDT, Referred to: Behavioral Health, Referred to: Kirsten Edmonds, Chronic post-traumatic stress disorder (PTSD)  Family problems                Orders:         ethinyl estradiol-norethindrone, 1 tab(s), Oral, daily, # 84 tab(s), 0 Refill(s), Type: Maintenance, [facility], 1 tab(s) Oral daily, 64, in, 19 11:15:00  CST, Height Measured, 163.4, lb, 04/29/20 11:29:00 CDT, Weight Measured, (Completed)         lisdexamfetamine, = 1 cap(s) ( 20 mg ), Oral, qam, # 30 cap(s), 0 Refill(s), Type: Maintenance, Pharmacy: [facility], 1 cap(s) Oral qam, 64, in, 01/08/21 9:05:00 CST, Height Measured, 157.6, lb, 01/08/21 9:05:00 CST, Weight Measured, (Completed)      pt has remarkable resilience and could use support of a counselor  Patient Information     Name:SENG SCHRADER      Address:      [address] [id number]     Sex:Female     YOB: 2000     [phone]     Emergency Contact:RAGHU SCHRADER     MRN:[id number]     FIN:[id number]     Location:[facility]     Date of Service:08/05/2021      Primary Care Physician:       Jazmin Vargas MD, [phone]      Attending Physician:       Jazmin Vargas MD, [phone]  Problem List/Past Medical History    Ongoing     ADHD (attention deficit hyperactivity disorder)     ADHD (attention deficit hyperactivity disorder), inattentive type     Concussion     History of COVID-19     Needle phobia     Syncope     Uses oral contraception    Historical     Viral pharyngitis  Procedure/Surgical History     No previous procedures        Medications    Adderall XR 25 mg oral capsule, extended release, 25 mg= 1 cap(s), Oral, qam    Adderall XR 25 mg oral capsule, extended release, 25 mg= 1 cap(s), Oral, qam  Allergies    Vyvanse (Irritability)    Concerta (Irritability, Anxiety)    methylphenidate (Irritability, Anxiety)  Social History    Smoking Status     Never smoker     Alcohol      Never     Electronic Cigarette/Vaping      Electronic Cigarette Use: Never.     Home/Environment      Lives with Father, Stepmother. Substance abuse in household: No. Smoker in household: No. Risks in environment: Does not wear helmet.     Sexual      Sexually active: No.     Substance Abuse      Never     Tobacco      Never (less than 100 in  lifetime)  Family History    Gastro-esophageal reflux disease: Mother.    Suicide..: Mother.  Immunizations       Scheduled Immunizations       Dose Date(s)       DTaP-IPV       01/17/2014       Hep A, pediatric/adolescent       11/16/2018       Hep B-Hib       2001, 2001, 10/03/2002       human papillomavirus vaccine       11/11/2013, 01/17/2014, 06/03/2014       influenza (LAIV)       11/11/2013       influenza virus vaccine, inactivated       11/09/2018, 11/18/2005, 01/12/2006, 11/17/2008       IPV       2001, 2001, 01/03/2002, 07/20/2006       meningococcal conjugate vaccine       07/19/2012, 11/16/2018       MMR (measles/mumps/rubella)       07/11/2002, 07/20/2006       tetanus/diphth/pertuss (Tdap) adult/adol       07/19/2012, 01/17/2014       varicella       07/11/2002, 06/01/2009       Other Immunizations               influenza       09/22/2011       influenza virus vaccine, inactivated       10/26/2010       pneumococcal (PCV7)       2001, 01/03/2002, 03/28/2002       DTaP       2001, 2001, 01/03/2002, 10/03/2002, 07/20/2006       influenza, H1N1, live       12/15/2009, 01/18/2010

## 2022-02-15 NOTE — PROGRESS NOTES
Chief Complaint    Got out of bed saturday, started to black out, fell on back of head. Headaches, dizzy since the fall. Not concerned about vision at this time. Has previously passed out before this incident.  History of Present Illness      patient present to clinic today for syncopal episode, now with concussion.  here with dad and stepmom,      scat 20/22 sx and 55/132 severity, also see scanned scat.  + headache, photosensitivity, fogginess,      has had about 6 similar episodes of syncope over the past 3 years, gets presyncopal symptoms about once per week.  describes as some tunnel vision and feeling confused and can then will try to catch herself but sometimes the symptoms come on too quickly for her to sit.  this time happened as she got up out of bed,      menses q month, 5 days, 3 tampons per day, occasional blood clot, much lighter since starting ocp      Review of systems is negative except as per HPI including:  no fevers, chills, sore throat, runny nose, nausea, vomiting, constipation, diarrhea, rash or new skin lesions, chest pain, palpitations, slurred speech, new paresthesia, shortness of breath or wheeze.      Exam:      General: alert and oriented ×3 no acute distress.      HEENT: pupils are equal round and reactive to light extraocular motion is intact. Normocephalic and atraumatic.       Hearing is grossly normal and there is no otorrhea. tm pearly grey nml light reflex,      Nares are patent there is no rhinorrhea.       Mucous membranes are moist and pink.      Chest: has bilateral rise with no increased work of breathing.      Cardiovascular: normal perfusion and brisk capillary refill.  s1s2 no mgr,      Musculoskeletal: no gross focal abnormalities and normal gait.  ttp at bilateral occipital head and traps and cervical paraspinal muscles      Neuro: no gross focal abnormalities and memory seems grossly  intact.  nml biceps, triceps and patellar reflexes      Psychiatric: speech is clear  and coherent and fluent. Patient dressed appropriately for the weather. Mood is appropriate and affect is full.                     Discussed with patient to return to clinic if symptoms worsen or do not improve  Physical Exam   Vitals & Measurements    T: 97.0   F (Tympanic)  HR: 79(Peripheral)  BP: 122/74  BP: 122/78(Sitting)  BP: 127/84(Standing)  BP: 121/79(Supine)  SpO2: 98%     HT: 64 in  WT: 148.2 lb   Assessment/Plan       Back pain (M54.9)         Orders:          82067 office outpatient visit 25 minutes (Charge), Quantity: 1, Concussion  Syncope  Headache  Neck pain  Back pain          Physical Therapy Evaluation and Treatment (Request), Headache  Neck pain  Back pain  Concussion                Concussion (S06.0X9A)         Orders:          LORazepam, = 1 tab(s) ( 0.5 mg ), Oral, once, Instructions: take 1 hour prior to appointment, # 1 tab(s), 0 Refill(s), Type: Soft Stop, Pharmacy: [id number], 1 tab(s) Oral once,Instr:take 1 hour prior to appointment, (Ordered)          34096 office outpatient visit 25 minutes (Charge), Quantity: 1, Concussion  Syncope  Headache  Neck pain  Back pain          Physical Therapy Evaluation and Treatment (Request), Headache  Neck pain  Back pain  Concussion          Return to Clinic (Request), RFV: follow up concussion and syncope, Return in 1 week          Return to Clinic (Request), RFV: needs lab only CBC with dif, TSH, BMP, mag, phos                Headache (R51)         Orders:          40957 office outpatient visit 25 minutes (Charge), Quantity: 1, Concussion  Syncope  Headache  Neck pain  Back pain          Physical Therapy Evaluation and Treatment (Request), Headache  Neck pain  Back pain  Concussion                Neck pain (M54.2)         Orders:          25364 office outpatient visit 25 minutes (Charge), Quantity: 1, Concussion  Syncope  Headache  Neck pain  Back pain          Physical Therapy Evaluation and Treatment (Request),  Headache  Neck pain  Back pain  Concussion                Needle phobia (F40.298)         step mom will bring pt back for lab appt and immunizations after she has had a chance to take an ativan and will bring her home after to watch over her until meds wear off                Syncope (R55)          discussed referral to cards, getting ekg and getting some labs,  event monitor, pt and parents would like to start with checking some labs, also urged pt to sit the moment she starts to feel symptoms immediately and to increase intake of fluids and use her salt shaker,         Orders:          LORazepam, = 1 tab(s) ( 0.5 mg ), Oral, once, Instructions: take 1 hour prior to appointment, # 1 tab(s), 0 Refill(s), Type: Soft Stop, Pharmacy: [id number], 1 tab(s) Oral once,Instr:take 1 hour prior to appointment, (Ordered)          07213 office outpatient visit 25 minutes (Charge), Quantity: 1, Concussion  Syncope  Headache  Neck pain  Back pain          Return to Clinic (Request), RFV: follow up concussion and syncope, Return in 1 week          Return to Clinic (Request), RFV: needs lab only CBC with dif, TSH, BMP, mag, phos                Orders:         Return to Clinic (Request), RFV: Nurse only- update immunizations, Return in Soon      25 minutes spent with patient in direct face to face contact, > 50% of time spent counseling and coordinating care.   Patient Information     Name:SENG SCHRADER      Address:      [address] [address]     Sex:Female     YOB: 2000     [phone]     Emergency Contact:RAGHU SCHRADER     MRN:[id number]     FIN:[id number]     Location:[facility]     Date of Service:11/13/2018      Primary Care Physician:       Jazmin Vargas MD, [phone]      Attending Physician:       Jazmin Vargas MD, [phone]  Problem List/Past Medical History    Ongoing     ADD (attention deficit disorder)       Comments:  'Possible'    Historical     Viral pharyngitis  Procedure/Surgical History     No previous procedures        Medications     Loestrin 21 1/20 oral tablet: 1 tab(s), PO, Daily, 84 tab(s), 3 Refill(s).     Adderall XR 20 mg oral capsule, extended release: 20 mg, 1 cap(s), PO, qAM, 30 cap(s), 0 Refill(s).     amphetamine-dextroamphetamine 20 mg oral capsule, extended release: See Instructions, Take 1 capsule by mouth every morning, 30 cap(s), 0 Refill(s).     amphetamine-dextroamphetamine 20 mg oral capsule, extended release: See Instructions, Take 1 capsule by mouth every morning, do not fill until 12/7/2018, 30 cap(s), 0 Refill(s).     Ativan 0.5 mg oral tablet: 0.5 mg, 1 tab(s), Oral, once, take 1 hour prior to appointment, 1 tab(s), 0 Refill(s).          Allergies    No Known Medication Allergies  Social History    Smoking Status - 11/13/2018     Never smoker     Alcohol      Never, 09/05/2018     Home and Environment      Lives with Father, Stepmother. Substance abuse in household: No. Smoker in household: No. Risks in environment: Does not wear helmet., 08/11/2015     Sexual      Sexually active: No., 08/11/2015     Substance Abuse      Never, 08/11/2015     Tobacco      Never, Household tobacco concerns: No., 05/13/2011  Family History    Gastro-esophageal reflux disease: Mother.    Suicide..: Mother.  Immunizations      Vaccine Date Status      influenza virus vaccine, inactivated 11/09/2018 Given      human papillomavirus vaccine 06/03/2014 Given      human papillomavirus vaccine 01/17/2014 Given      DTaP-IPV 01/17/2014 Recorded      tetanus/diphth/pertuss (Tdap) adult/adol 01/17/2014 Recorded      influenza (LAIV) 11/11/2013 Given      human papillomavirus vaccine 11/11/2013 Given      meningococcal conjugate vaccine 07/19/2012 Given      tetanus/diphth/pertuss (Tdap) adult/adol 07/19/2012 Given      influenza 09/22/2011 Given      influenza virus vaccine, inactivated 10/26/2010 Given      influenza, H1N1,  live 01/18/2010 Recorded      influenza, H1N1, live 12/15/2009 Recorded      varicella 06/01/2009 Recorded      IPV 07/20/2006 Recorded      MMR (measles/mumps/rubella) 07/20/2006 Recorded      DTaP 07/20/2006 Recorded      Hep B-Hib 10/03/2002 Recorded      DTaP 10/03/2002 Recorded      MMR (measles/mumps/rubella) 07/11/2002 Recorded      varicella 07/11/2002 Recorded      pneumococcal (PCV7) 03/28/2002 Recorded      IPV 01/03/2002 Recorded      pneumococcal (PCV7) 01/03/2002 Recorded      DTaP 01/03/2002 Recorded      IPV 2001 Recorded      Hep B-Hib 2001 Recorded      DTaP 2001 Recorded      IPV 2001 Recorded      Hep B-Hib 2001 Recorded      pneumococcal (PCV7) 2001 Recorded      DTaP 2001 Recorded

## 2022-02-15 NOTE — TELEPHONE ENCOUNTER
Patient Information     Name:SENG SCHRADER      Address:      [id number] [place] [id number]     Sex:Female     YOB: 2000     [phone]     Emergency Contact:RAGHU SCHRADER     MRN:[id number]     FIN:[id number]     Location:[facility]     Date of Service:10/04/2017      Primary Care Physician:       Marcelino Vargas MDica, [phone]   pt notified of negative strep culture.  She is feeling much better.  FU prn

## 2022-02-15 NOTE — PROGRESS NOTES
Patient:   SENG SCHRADER            MRN: [id number]            FIN: [id number]               Age:   19 years     Sex:  Female     :  2000   Associated Diagnoses:   None   Author:   Guillaume Espinoza MD      Visit Information      Date of Service: 10/09/2019 11:40 am  Performing Location: [facility]  Encounter#: [id number]      Chief Complaint   10/9/2019 11:49 AM CDT   LBP, dysuria, frequency x 1 week.        History of Present Illness             The patient presents with dysuria.  The dysuria is characterized by burning.  The severity of the dysuria is moderate.  The dysuria is constant.  There were exacerbating factors.  The onset was sudden.        Review of Systems   Constitutional:  No fever, No chills, No sweats.    Gastrointestinal:  No nausea, No vomiting, No abdominal pain.    Genitourinary:  Dysuria, No hematuria, No urethral discharge.    Gynecologic:  No vaginal discharge.       Health Status   Allergies:    Allergic Reactions (Selected)  No Known Medication Allergies   Medications:  (Selected)   Prescriptions  Prescribed  Loestrin 21  oral tablet: 1 tab(s), PO, Daily, # 84 tab(s), 3 Refill(s), Type: Maintenance, Pharmacy: [facility], 1 tab(s) Oral daily  Macrobid 100 mg oral capsule: = 1 cap(s) ( 100 mg ), Oral, bid, x 10 day(s), # 20 cap(s), 0 Refill(s), Type: Acute, Pharmacy: [id number], 1 cap(s) Oral bid,x10 day(s)  Vyvanse 30 mg oral capsule: = 1 cap(s) ( 30 mg ), Oral, qam, # 30 cap(s), 0 Refill(s), Type: Maintenance, Pharmacy: Replaced by [facility] [place], 1 cap(s) Oral qam  Vyvanse 30 mg oral capsule: = 1 cap(s) ( 30 mg ), Oral, qam, # 30 cap(s), 0 Refill(s), Type: Maintenance, Pharmacy: Replaced by [facility] [place], 1 cap(s) Oral qam   Problem list:    All Problems  ADD (attention deficit disorder) / SNOMED CT 0417008460 / Confirmed  ADHD (attention deficit hyperactivity disorder) / SNOMED CT 2991923745 / Confirmed  Concussion /  SNOMED CT 8500902983 / Confirmed  Needle phobia / SNOMED CT 839438508 / Confirmed  Uses oral contraception / SNOMED CT 48809069 / Confirmed  Syncope / SNOMED CT 687523880 / Confirmed  Resolved: Viral pharyngitis / SNOMED CT 9402042      Histories   Past Medical History:    Active  ADD (attention deficit disorder) (5137128711)  Comments:  2011 CDT 11:51 AM CDT - [facility] [place]  'Possible'  Resolved  Viral pharyngitis (4589526):  Resolved.   Family History:    Gastro-esophageal reflux disease  Mother ()  Suicide..  Mother ()     Procedure history:    No previous procedures.   Social History:        Alcohol Assessment            Never      Tobacco Assessment            Never, Household tobacco concerns: No.      Substance Abuse Assessment            Never      Home and Environment Assessment            Lives with Father, Stepmother.  Substance abuse in household: No.  Smoker in household: No.  Risks in               environment: Does not wear helmet.      Sexual Assessment            Sexually active: No.        Physical Examination   Vital Signs   10/9/2019 11:49 AM CDT Temperature Tympanic 97.5 DegF  LOW    Peripheral Pulse Rate 76 bpm    Pulse Site Radial artery    HR Method Manual    Systolic Blood Pressure 112 mmHg    Diastolic Blood Pressure 64 mmHg    Mean Arterial Pressure 80 mmHg    BP Site Right arm    BP Method Manual      Measurements from flowsheet : Measurements   10/9/2019 11:49 AM CDT   Weight Measured - Standard                137 lb     Point of care testing:  Urine dipstick.    General:  Alert and oriented.    Gastrointestinal:  Soft, Non-tender, Non-distended, Normal bowel sounds, No organomegaly.    Genitourinary:  No flank pain.       Review / Management   Results review:       Interpretation: Abnormal results  U/A c/w UTI.       Impression and Plan   Diagnosis   Orders     F/U in 48-72 hours if not improving, sooner if getting worse.

## 2022-02-15 NOTE — TELEPHONE ENCOUNTER
---------------------  From: Suri Delacruz LPN   To: [facility] Message Pool ([id number]_[place] [facility] [place]);     Sent: 4/11/2019 3:10:27 PM CDT  Subject: med refill     PCP:   Mariila ARELLANO     Time of Call:  _ [phone]       Person Calling:  Marilia Porter  Phone number:  _ [phone]    Returned call at: _[phone]    Note:   _ Pt LM stating that she is out of her Adderall and would like a refill sent to [facility].    please advise       Medication:   _ Adderall XR 20mg   Last Filled:  _ 02-   Quantity:  _ 30 Refills:  _0---------------------  From: Lorean Saldana MA ([facility] Message Pool (32224[facility]))   To: Bryant Boyer MD;     Sent: 4/11/2019 3:16:17 PM CDT  Subject: FW: med refill  ** Submitted: **  Order:amphetamine-dextroamphetamine (amphetamine-dextroamphetamine 20 mg oral capsule, extended release)  1 cap(s)  Oral  qam  Qty:  30 cap(s)        Refills:  0          Route To Pharmacy - [id number]    Signed by Bryant Boyer MD  4/11/2019 4:20:00 PM---------------------  From: Bryant Boyer MD   To: [facility] Message Pool ([id number]_[place] [facility] [place]);     Sent: 4/11/2019 4:23:39 PM CDT  Subject: RE: med refill---------------------  From: Lorena Saldana MA ([facility] Message Pool (32224_[facility]))   To: [employer] Pool ([address]);     Sent: 4/11/2019 4:25:41 PM CDT  Subject: FW: med refillcalled and LM for pt stating that medication was sent to [facility]

## 2022-02-15 NOTE — PROGRESS NOTES
Patient:   SENG SCHRADER            MRN: [id number]            FIN: [id number]               Age:   18 years     Sex:  Female     :  2000   Associated Diagnoses:   None   Author:   Alicia TAMEZ, Jazmin      Procedure   EKG procedure   Indication: syncope.     Position: supine.     EKG findings   Interpretation: by primary care provider.     Rhythm: sinus.     Axis: normal axis, normal configuration.     Within normal limits.     Intervals: [place] normal, QRS normal, QT normal.     Normal EKG.     P waves: normal.     QRS complex: normal, no Q waves present.     ST-T-U complex: normal.     Interpretation: normal EKG, no ST-T wave abnormalities.     Discussed: with patient.        Impression and Plan   Orders

## 2022-02-15 NOTE — TELEPHONE ENCOUNTER
---------------------  From: Shruthi Gautam CMA (Phone Messages Pool (32224_[facility]))   To: [facility] Message Pool ([address]_[place] [facility] [place]);     Sent: 9/19/2019 11:37:49 AM CDT  Subject: Medication       Phone Message    PCP:   EILEEN      Time of Call:  10:30       Person Calling:  Pt  Phone number:  [phone]    Note:  Patient call because her prescription for Vyvanse is still not approved by [facility].  She has been out of medication for 5 days.    Please check status for patient.    Thanks      Transferred to: [facility] poolPaperwork in [facility] box. Needing ICD codeDx code placed and Rx resent per [facility]      LUCIO Zapata CMAPt informed that pharmacy should be filling her Rx and they will call her when ready for , she verbalized a good understanding.       LUCIO Zapata CMA

## 2022-02-15 NOTE — PROGRESS NOTES
Chief Complaint    Med check. due to the new medication pt feels lanier all the time and PTSD and anxiety are worse  History of Present Illness      Patient reports that has been more poorly controlled.      She thinks it is related to the Vyvanse or possibly her oral contraceptive pill.  She notices that on the days that she takes the Vyvanse she has increased irritability.  Her PHQ 9 today's equal to 12 her AI 7 is equal to 15.  She takes birth control pill for 21 days and then has no pills for 7 days.  She does not notice any differences in her mood but that she is taking her birth control pill or not.  Before starting any medications for mood disorder she would like to try switching the Vyvanse to something else.  She has tolerated Adderall well in the past unfortunately her insurance requires that she failed to other medications before she can get a prior authorization approved for this.  She recalls being on methylphenidate in the past but does not recall why it was switched to Adderall.  She has no suicidal ideation or homicidal ideation no auditory or visual hallucinations.  Things are going well with her relationship with her boyfriend.  She is thinking about starting school next year and is currently working at [employer].  Is planning to spend Radhika with her family.  Physical Exam   Vitals & Measurements    T: 97.8   F (Tympanic)  HR: 88(Peripheral)  BP: 120/80     WT: 148.4 lb       General alert and oriented x3 no acute distress       Psych patient has fair insight and fair judgment.  Her affect is slightly blunted her mood is anxious and irritable her thought processes seem intact her memory seems intact speech is clear cognition intact makes good eye contact   Assessment/Plan       ADHD (attention deficit hyperactivity disorder) (F90.0)         Explained to patient that I am concerned about her mood and would like her to return to clinic before the end of the month so we can evaluate this  again and determine if the methylphenidate is the right medicine for her and if her mood is still poorly controlled.  She can certainly return to clinic sooner if needed.  15 minutes spent with patient in direct face-to-face contact of which greater than 50% of time spent counseling patient and coordinating care.         Ordered:          methylphenidate, = 1 tab(s) ( 18 mg ), Oral, qam, # 30 tab(s), 0 Refill(s), Type: Maintenance, Pharmacy: [facility], 1 tab(s) Oral qam, (Ordered)           Patient Information     Name:SENG SCHRADER      Address:      [id number] [place] [id number]     Sex:Female     YOB: 2000     [phone]     Emergency Contact:RAGHU SCHRADER     MRN:[id number]     FIN:[id number]     Location:[facility]     Date of Service:11/26/2019      Primary Care Physician:       Jazmin Vargas MD, [phone]      Attending Physician:       Jazmin Vargas MD, [phone]  Problem List/Past Medical History    Ongoing     ADD (attention deficit disorder)       Comments: 'Possible'     ADHD (attention deficit hyperactivity disorder)     Concussion     Needle phobia     Syncope     Uses oral contraception    Historical     Viral pharyngitis  Procedure/Surgical History     No previous procedures  Medications    Concerta 18 mg/24 hr oral tablet, extended release, 18 mg= 1 tab(s), Oral, qam    Loestrin 21 1/20 oral tablet, 1 tab(s), Oral, daily, 3 refills    Vyvanse 30 mg oral capsule, See Instructions    Vyvanse 30 mg oral capsule, 1 cap(s), Oral, qam  Allergies    Vyvanse (Irritability)  Social History    Smoking Status - 11/26/2019     Never smoker     Alcohol      Never, 09/05/2018     Home/Environment      Lives with Father, Stepmother. Substance abuse in household: No. Smoker in household: No. Risks in environment: Does not wear helmet., 08/11/2015     Sexual      Sexually active: No., 08/11/2015     Substance Abuse       Never, 08/11/2015     Tobacco      Never, Household tobacco concerns: No., 05/13/2011  Family History    Gastro-esophageal reflux disease: Mother.    Suicide..: Mother.  Immunizations      Vaccine Date Status      meningococcal conjugate vaccine 11/16/2018 Given      Hep A, pediatric/adolescent 11/16/2018 Given      influenza virus vaccine, inactivated 11/09/2018 Given      human papillomavirus vaccine 06/03/2014 Given      human papillomavirus vaccine 01/17/2014 Given      DTaP-IPV 01/17/2014 Recorded      tetanus/diphth/pertuss (Tdap) adult/adol 01/17/2014 Recorded      influenza (LAIV) 11/11/2013 Given      human papillomavirus vaccine 11/11/2013 Given      meningococcal conjugate vaccine 07/19/2012 Given      tetanus/diphth/pertuss (Tdap) adult/adol 07/19/2012 Given      influenza 09/22/2011 Given      influenza virus vaccine, inactivated 10/26/2010 Given      influenza, H1N1, live 01/18/2010 Recorded      influenza, H1N1, live 12/15/2009 Recorded      varicella 06/01/2009 Recorded      DTaP 07/20/2006 Recorded      MMR (measles/mumps/rubella) 07/20/2006 Recorded      IPV 07/20/2006 Recorded      DTaP 10/03/2002 Recorded      Hep B-Hib 10/03/2002 Recorded      varicella 07/11/2002 Recorded      MMR (measles/mumps/rubella) 07/11/2002 Recorded      pneumococcal (PCV7) 03/28/2002 Recorded      DTaP 01/03/2002 Recorded      pneumococcal (PCV7) 01/03/2002 Recorded      IPV 01/03/2002 Recorded      DTaP 2001 Recorded      Hep B-Hib 2001 Recorded      IPV 2001 Recorded      DTaP 2001 Recorded      pneumococcal (PCV7) 2001 Recorded      Hep B-Hib 2001 Recorded      IPV 2001 Recorded  Lab Results          Lab Results (Last 4 results within 90 days)           UA pH: 7 [5  - 8] (10/09/19 12:02:00)          UA Specific Gravity: 1.015 [1.001  - 1.035] (10/09/19 12:02:00)          UA Glucose: NEGATIVE [NEGATIVE  - NEGATIVE] (10/09/19 12:02:00)          UA Bilirubin: NEGATIVE [NEGATIVE   - NEGATIVE] (10/09/19 12:02:00)          UA Ketones: NEGATIVE [NEGATIVE  - NEGATIVE] (10/09/19 12:02:00)          Urine Occult Blood: 2+ Abnormal [NEGATIVE  - NEGATIVE] (10/09/19 12:02:00)          UA Protein: 2+ Abnormal [NEGATIVE  - NEGATIVE] (10/09/19 12:02:00)          UA Nitrite: NEGATIVE [NEGATIVE  - NEGATIVE] (10/09/19 12:02:00)          UA Leukocyte Esterase: 2+ Abnormal [NEGATIVE  - NEGATIVE] (10/09/19 12:02:00)          UA WBC Clumps: Present (10/09/19 12:18:00)          UA Epithelial Cells: Few [None  - Few] (10/09/19 12:18:00)          UA Mucous: Present (10/09/19 12:18:00)          UA WBC: >100 [None  - 5] (10/09/19 12:18:00)          UA RBC: 26-50 [None  - 2] (10/09/19 12:18:00)          UA Bacteria: Many [None  - Few] (10/09/19 12:18:00)          Culture Urine: See comment Abnormal (10/09/19 12:04:00)

## 2022-02-15 NOTE — PROGRESS NOTES
Patient:   SENG SCHRADER            MRN: [id number]            FIN: [id number]               Age:   19 years     Sex:  Female     :  2000   Associated Diagnoses:   ADD (attention deficit disorder)   Author:   Bryant Boyer MD      Visit Information      Date of Service: 2019 02:15 pm  Performing Location: [facility]  Encounter#: [id number]      Primary Care Provider (PCP):  Jazmin Vargas MD    NPI# [id number]      Chief Complaint   2019 2:14 PM CDT    pre-employment exam--[employer].        History of Present Illness             The patient presents for a general exam.  The patient's general health status is described as good.     She is an  for [employer]      Review of Systems   Constitutional:  No fever, No chills, No sweats, No weakness, No fatigue.    Eye:  No recent visual problem.    Ear/Nose/Mouth/Throat:  No decreased hearing, No nasal congestion, No sore throat.    Respiratory:  No shortness of breath, No cough.    Cardiovascular:  Negative, No chest pain, No palpitations, No peripheral edema.    Gastrointestinal:  No nausea, No vomiting, No diarrhea, No constipation, No heartburn.    Genitourinary:  No dysuria, No change in urine stream.    Hematology/Lymphatics:  No bruising tendency, No bleeding tendency.    Endocrine:  No cold intolerance, No heat intolerance.    Immunologic:  Negative.    Musculoskeletal:  No back pain, No neck pain, No joint pain, No muscle pain.    Integumentary:  No rash, No dryness, No skin lesion.    Neurologic:  Alert and oriented X4, No headache.    Psychiatric:  No anxiety, No depression.       Health Status   Allergies:    Allergic Reactions (Selected)  No known allergies  No Known Medication Allergies   Medications:  (Selected)   Prescriptions  Prescribed  Adderall XR 25 mg oral capsule, extended release: = 1 cap(s) ( 25 mg ), PO, qAM, Instructions: fill on or after 2019, # 30 cap(s), 0 Refill(s), Type: Maintenance,  Pharmacy: [id number], 1 cap(s) Oral qam,Instr:fill on or after 2019  Loestrin 21  oral tablet: 1 tab(s), PO, Daily, # 84 tab(s), 3 Refill(s), Type: Maintenance, Pharmacy: [id number], 1 tab(s) Oral daily   Problem list:    All Problems (Selected)  ADD (attention deficit disorder) / SNOMED CT 3477329856 / Confirmed  Concussion / SNOMED CT 2098358936 / Confirmed  Needle phobia / SNOMED CT 002227454 / Confirmed  Syncope / SNOMED CT 966120518 / Confirmed      Histories   Past Medical History:    Active  ADD (attention deficit disorder) (3607194218)  Comments:  2011 CDT 11:51 AM CDT - Lorena Hood  'Possible'  Resolved  Viral pharyngitis (5032954):  Resolved.   Family History:    Gastro-esophageal reflux disease  Mother ()  Suicide..  Mother ()     Procedure history:    No previous procedures.   Social History:        Alcohol Assessment            Never      Tobacco Assessment            Never, Household tobacco concerns: No.      Substance Abuse Assessment            Never      Home and Environment Assessment            Lives with Father, Stepmother.  Substance abuse in household: No.  Smoker in household: No.  Risks in               environment: Does not wear helmet.      Sexual Assessment            Sexually active: No.        Physical Examination   Vital Signs   2019 2:14 PM CDT Temperature Tympanic 98.5 DegF    Peripheral Pulse Rate 84 bpm    Pulse Site Radial artery    HR Method Manual    Systolic Blood Pressure 114 mmHg    Diastolic Blood Pressure 72 mmHg    Mean Arterial Pressure 86 mmHg    BP Site Right arm    BP Method Manual      Measurements from flowsheet : Measurements   2019 2:14 PM CDT Height Measured - Standard 64 in    Weight Measured - Standard 140.6 lb    BSA 1.7 m2    Body Mass Index 24.13 kg/m2    Body Mass Index Percentile 76.69      General:  Alert and oriented, No acute distress.    Eye:  Pupils are equal, round and reactive to light,  Extraocular movements are intact, Normal conjunctiva.    HENT:  Normocephalic, Tympanic membranes are clear, Oral mucosa is moist, No pharyngeal erythema, No sinus tenderness.    Neck:  Supple, Non-tender, No carotid bruit, No lymphadenopathy, No thyromegaly.    Respiratory:  Lungs are clear to auscultation, Respirations are non-labored, Breath sounds are equal, No chest wall tenderness.    Cardiovascular:  Normal rate, Regular rhythm, No murmur, No gallop, Normal peripheral perfusion, No edema.    Gastrointestinal:  Soft, Non-tender, No organomegaly.    Musculoskeletal:  Normal range of motion, Normal strength, No swelling, No deformity.    Integumentary:  Warm, Dry, No rash.    Neurologic:  Alert, Oriented, No focal deficits.    Psychiatric:  Cooperative, Appropriate mood & affect.       Impression and Plan   Diagnosis     ADD (attention deficit disorder) (VQE51-XM F98.8).     Course:  Progressing as expected.    Plan:  forms filled out and signed.

## 2022-02-15 NOTE — NURSING NOTE
Comprehensive Intake Entered On:  2/11/2021 9:21 AM CST    Performed On:  2/11/2021 9:16 AM CST by Aimee Ernandez LPN               Summary   Chief Complaint :   needs medication refill - feels like Vyvanse is not working, would like to try something different   Menstrual Status :   Menarcheal   Weight Measured :   152.6 lb(Converted to: 152 lb 10 oz, 69.218 kg)    Height Measured :   64 in(Converted to: 5 ft 4 in, 162.56 cm)    Body Mass Index :   26.19 kg/m2   Body Surface Area :   1.77 m2   Height/Length Estimated :   64 in(Converted to: 5 ft 4 in, 162.56 cm)    Systolic Blood Pressure :   122 mmHg   Diastolic Blood Pressure :   76 mmHg   Mean Arterial Pressure :   91 mmHg   Peripheral Pulse Rate :   97 bpm   BP Site :   Right arm   BP Method :   Manual   Temperature Tympanic :   98.3 DegF(Converted to: 36.8 DegC)    Oxygen Saturation :   98 %   Aimee Ernandez LPN - 2/11/2021 9:16 AM CST   Health Status   Allergies Verified? :   Yes   Medication History Verified? :   Yes   Medical History Verified? :   No   Pre-Visit Planning Status :   Completed   Tobacco Use? :   Never smoker   Aimee Ernandez LPN - 2/11/2021 9:16 AM CST   Meds / Allergies   (As Of: 2/11/2021 9:21:36 AM CST)   Allergies (Active)   Concerta  Estimated Onset Date:   Unspecified ; Reactions:   Anxiety, Irritability ; Created By:   Chloé Zapata CMA; Reaction Status:   Active ; Category:   Drug ; Substance:   Concerta ; Type:   Side Effect ; Updated By:   Chloé Zapata CMA; Reviewed Date:   1/8/2021 12:25 PM CST      methylphenidate  Estimated Onset Date:   Unspecified ; Reactions:   Anxiety, Irritability ; Created By:   Chloé Zapata CMA; Reaction Status:   Active ; Category:   Drug ; Substance:   methylphenidate ; Type:   Side Effect ; Updated By:   Chloé Zapata CMA; Reviewed Date:   1/8/2021 12:28 PM CST      Vyvanse  Estimated Onset Date:   Unspecified ; Reactions:   Irritability ; Created By:   Jazmin Vargas MD; Reaction  Status:   Active ; Category:   Drug ; Substance:   Vyvanse ; Type:   Side Effect ; Severity:   Moderate ; Updated By:   Jazmin Vargas MD; Reviewed Date:   1/8/2021 9:08 AM CST        Medication List   (As Of: 2/11/2021 9:21:36 AM CST)   Prescription/Discharge Order    lisdexamfetamine  :   lisdexamfetamine ; Status:   Prescribed ; Ordered As Mnemonic:   Vyvanse 20 mg oral capsule ; Simple Display Line:   20 mg, 1 cap(s), Oral, qam, 30 cap(s), 0 Refill(s) ; Ordering Provider:   Jazmin Vargas MD; Catalog Code:   lisdexamfetamine ; Order Dt/Tm:   1/26/2021 2:46:51 PM CST          ethinyl estradiol-norethindrone  :   ethinyl estradiol-norethindrone ; Status:   Prescribed ; Ordered As Mnemonic:   ethinyl estradiol-norethindrone 20 mcg-1 mg oral tablet ; Simple Display Line:   1 tab(s), Oral, daily, 84 tab(s), 0 Refill(s) ; Ordering Provider:   Jazmin Vargas MD; Catalog Code:   ethinyl estradiol-norethindrone ; Order Dt/Tm:   9/21/2020 10:57:52 AM CDT            ID Risk Screen   Recent Travel History :   No recent travel   Family Member Travel History :   No recent travel   Other Exposure to Infectious Disease :   Unknown   COVID-19 Testing Status :   Positive COVID-19 test greater than 30 days ago   Aimee Ernandez LPN - 2/11/2021 9:16 AM CST

## 2022-02-15 NOTE — TELEPHONE ENCOUNTER
Entered by Lorena Saldana MA on September 21, 2020 10:58:10 AM CDT  ---------------------  From: Lorena Saldana MA   To: [facility]    Sent: 9/21/2020 10:58:10 AM CDT  Subject: Medication Management     ** Submitted: **  Order:ethinyl estradiol-norethindrone (ethinyl estradiol-norethindrone 20 mcg-1 mg oral tablet)  1 tab(s)  Oral  daily  Qty:  84 tab(s)        Refills:  0          Substitutions Allowed     Route To [facility]    Signed by Lorena Saldana MA  9/21/2020 3:57:00 PM [facility]    ** Submitted: **  Complete:ethinyl estradiol-norethindrone (Loestrin 21 1/20 oral tablet)   Signed by Lorena Saldana MA  9/21/2020 3:58:00 PM [facility]    ** Not Approved:  **  ethinyl estradiol-norethindrone (NORETH/ETHIN 1/20 TAB MG TABLET)  TAKE ONE TABLET BY MOUTH ONCE DAILY  Qty:  84 unknown unit        Days Supply:  0        Refills:  0          Substitutions Allowed     Route To [facility]   Signed by Lorena Saldana MA            ------------------------------------------  From: [facility]  To: Jazmin Vargas MD  Sent: September 19, 2020 11:06:48 AM CDT  Subject: Medication Management  Due: September 9, 2020 4:21:06 PM CDT     ** On Hold Pending Signature **     Dispensed Drug: ethinyl estradiol-norethindrone (ethinyl estradiol-norethindrone 20 mcg-1 mg oral tablet), TAKE ONE TABLET BY MOUTH ONCE DAILY  Quantity: 84 unknown unit  Days Supply: 0  Refills: 0  Substitutions Allowed  Notes from Pharmacy:  ------------------------------------------

## 2022-02-15 NOTE — TELEPHONE ENCOUNTER
---------------------  From: Jazmin Vargas MD   To: [place] [facility] ([id number] [facility] [place]);     Sent: 2/11/2021 10:06:00 AM CST  Subject: General Message     please call pharmacy and cancel the adderall 10 mg, we will also need to do a PA for the Adderall 25 mg, she has been on methylhenidate IR and Concerta and vyvanse, each for at least 2 months with 1 dose change,Started the forms for both strengths of Adderall and placed in your box to be completed and signed.---------------------  From: Aimee Ernandez LPN ([employer] [facility] ([id number]))   To: Jazmin Vargas MD;     Sent: 2/11/2021 10:58:27 AM CST  Subject: FW: General Message---------------------  From: Jazmin Vargas MD   To: [place] [facility] ([id number] [facility] [place]);     Sent: 2/11/2021 4:54:14 PM CST  Subject: RE: General Message     can you call and cancel the 10 mg Rx?[id number] Spoke with Alma Rosa at [facility] and asked that she cancel Adderall XR 10mg rx.PA paperwork has been completed and signed. Faxed to [facility] @ [phone]. Confirmation received.

## 2022-02-15 NOTE — LETTER
(Inserted Image. Unable to display)         February 06, 2020        SENG SCHRADER  [id number] [place], [place] [id number]        Dear SENG,    Thank you for selecting [facility] [[email or web address]] for your healthcare needs.    Our records indicate you are due for the following services:     Follow-up office visit      To schedule an appointment or if you have further questions, please contact your primary clinic:   [facility]       [phone]   [facility]       [phone]              [facility]     [phone]      Powered by [employer]    Sincerely,    Jazmin Vargas MD
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
## 2022-02-15 NOTE — NURSING NOTE
Comprehensive Intake Entered On:  1/2/2021 11:15 AM CST    Performed On:  1/2/2021 11:12 AM CST by Hanna Sow               Summary   Chief Complaint :   Sore throat, fever started yesterday.  Positive covid on 12/15.    Menstrual Status :   Menarcheal   Height/Length Estimated :   64 in(Converted to: 5 ft 4 in, 162.56 cm)    Systolic Blood Pressure :   120 mmHg   Diastolic Blood Pressure :   79 mmHg   Mean Arterial Pressure :   93 mmHg   Peripheral Pulse Rate :   102 bpm (HI)    BP Method :   Electronic   HR Method :   Electronic   Temperature Tympanic :   97.4 DegF(Converted to: 36.3 DegC)  (LOW)    Oxygen Saturation :   98 %   Hanna Sow - 1/2/2021 11:12 AM CST   Health Status   Allergies Verified? :   Yes   Medication History Verified? :   Yes   Tobacco Use? :   Never smoker   Hanna Sow - 1/2/2021 11:12 AM CST   Meds / Allergies   (As Of: 1/2/2021 11:15:15 AM CST)   Allergies (Active)   Vyvanse  Estimated Onset Date:   Unspecified ; Reactions:   Irritability ; Created By:   Jazmin Vargas MD; Reaction Status:   Active ; Category:   Drug ; Substance:   Vyvanse ; Type:   Side Effect ; Severity:   Moderate ; Updated By:   Jazmin Vargas MD; Reviewed Date:   12/15/2020 3:17 PM CST        Medication List   (As Of: 1/2/2021 11:15:15 AM CST)   Prescription/Discharge Order    ethinyl estradiol-norethindrone  :   ethinyl estradiol-norethindrone ; Status:   Prescribed ; Ordered As Mnemonic:   ethinyl estradiol-norethindrone 20 mcg-1 mg oral tablet ; Simple Display Line:   1 tab(s), Oral, daily, 84 tab(s), 0 Refill(s) ; Ordering Provider:   Jazmin Vargas MD; Catalog Code:   ethinyl estradiol-norethindrone ; Order Dt/Tm:   9/21/2020 10:57:52 AM CDT          methylphenidate  :   methylphenidate ; Status:   Prescribed ; Ordered As Mnemonic:   methylphenidate 10 mg oral tablet ; Simple Display Line:   15 mg, 1.5 tab(s), Oral, bid, 90 tab(s), 0 Refill(s) ; Ordering Provider:   Alicia TAMEZ,  Jazmin; Catalog Code:   methylphenidate ; Order Dt/Tm:   9/11/2020 6:19:54 PM CDT          methylphenidate  :   methylphenidate ; Status:   Prescribed ; Ordered As Mnemonic:   methylphenidate 10 mg oral tablet ; Simple Display Line:   15 mg, 1.5 tab(s), Oral, bid, 90 tab(s), 0 Refill(s) ; Ordering Provider:   Jazmin Vargas MD; Catalog Code:   methylphenidate ; Order Dt/Tm:   3/24/2020 5:30:34 PM CDT            ID Risk Screen   Recent Travel History :   No recent travel   Family Member Travel History :   No recent travel   Other Exposure to Infectious Disease :   Unknown   COVID-19 Testing Status :   Positive COVID-19 test in the last 30 days   Hanna Sow - 1/2/2021 11:12 AM CST

## 2022-02-15 NOTE — TELEPHONE ENCOUNTER
---------------------  From: Zulma Owens (Phone Messages Pool ([id number]_[facility]))   To: [facility] Message Pool ([id number]_[place] [facility] [place]);     Sent: 1/7/2020 4:09:55 PM CST  Subject: incorrect dx code     Phone Message    PCP:   EILEEN                         Time of Call:  349p                                        Person Calling:  [facility].   Phone number:  [phone]      Note:   Pharmacist called wanting to inform [facility] that Concerta was sent with a dx code of F988 which is not covered by insurance under that dx code. Pt informed them that she is taking it because of ADHD. Please resend with appropriate dx code.     office visit and reason:  01/07/2020; ADHD med check---------------------  From: Alice Lucas CMA ([facility] Message Pool (32224_[facility]))   To: Jazmin Vargas MD;     Sent: 1/7/2020 4:18:41 PM CST  Subject: FW: incorrect dx code     Please send rx with correct dx.

## 2022-02-15 NOTE — NURSING NOTE
Comprehensive Intake Entered On:  1/29/2019 3:44 PM CST    Performed On:  1/29/2019 3:40 PM CST by Alice Lucas CMA               Summary   Chief Complaint :   rx refill- adderall. Birth control questions. Parents have signed authorization for patient to be seen under 18 years of age.    Menstrual Status :   Menarcheal   Weight Measured :   141.6 lb(Converted to: 141 lb 10 oz, 64.23 kg)    Systolic Blood Pressure :   112 mmHg   Diastolic Blood Pressure :   66 mmHg   Mean Arterial Pressure :   81 mmHg   Peripheral Pulse Rate :   92 bpm (HI)    BP Site :   Right arm   BP Method :   Manual   HR Method :   Electronic   Temperature Tympanic :   96.4 DegF(Converted to: 35.8 DegC)  (LOW)    Oxygen Saturation :   98 %   Alice Lucas CMA - 1/29/2019 3:40 PM CST   Health Status   Allergies Verified? :   Yes   Medication History Verified? :   Yes   Pre-Visit Planning Status :   Completed   Tobacco Use? :   Never smoker   Alice Lucas CMA - 1/29/2019 3:40 PM CST   Consents   Consent for Immunization Exchange :   Consent Granted   Consent for Immunizations to Providers :   Consent Granted   Alice Lucas CMA - 1/29/2019 3:40 PM CST   Meds / Allergies   (As Of: 1/29/2019 3:44:16 PM CST)   Allergies (Active)   No Known Medication Allergies  Estimated Onset Date:   Unspecified ; Created By:   Deanne Schmitt CMA; Reaction Status:   Active ; Category:   Drug ; Substance:   No Known Medication Allergies ; Type:   Allergy ; Updated By:   Deanne Schmitt CMA; Reviewed Date:   10/2/2018 2:10 PM CDT        Medication List   (As Of: 1/29/2019 3:44:16 PM CST)   Prescription/Discharge Order    amphetamine-dextroamphetamine  :   amphetamine-dextroamphetamine ; Status:   Prescribed ; Ordered As Mnemonic:   Adderall XR 20 mg oral capsule, extended release ; Simple Display Line:   20 mg, 1 cap(s), PO, qAM, 30 cap(s), 0 Refill(s) ; Ordering Provider:   Jazmin Vargas MD; Catalog Code:   amphetamine-dextroamphetamine ; Order Dt/Tm:   10/2/2018  2:18:01 PM          amphetamine-dextroamphetamine  :   amphetamine-dextroamphetamine ; Status:   Prescribed ; Ordered As Mnemonic:   amphetamine-dextroamphetamine 20 mg oral capsule, extended release ; Simple Display Line:   20 mg, 1 cap(s), Oral, qam, 30 cap(s), 0 Refill(s) ; Ordering Provider:   Jazmin aVrgas MD; Catalog Code:   amphetamine-dextroamphetamine ; Order Dt/Tm:   12/14/2018 3:47:13 PM          amphetamine-dextroamphetamine  :   amphetamine-dextroamphetamine ; Status:   Prescribed ; Ordered As Mnemonic:   amphetamine-dextroamphetamine 20 mg oral capsule, extended release ; Simple Display Line:   See Instructions, Take 1 capsule by mouth every morning, do not fill until 12/7/2018, 30 cap(s), 0 Refill(s) ; Ordering Provider:   Jazmin Vargas MD; Catalog Code:   amphetamine-dextroamphetamine ; Order Dt/Tm:   11/9/2018 2:15:30 PM          amphetamine-dextroamphetamine  :   amphetamine-dextroamphetamine ; Status:   Prescribed ; Ordered As Mnemonic:   amphetamine-dextroamphetamine 20 mg oral capsule, extended release ; Simple Display Line:   See Instructions, Take 1 capsule by mouth every morning, 30 cap(s), 0 Refill(s) ; Ordering Provider:   Garima Chavarria; Catalog Code:   amphetamine-dextroamphetamine ; Order Dt/Tm:   11/8/2018 1:50:52 PM          ethinyl estradiol-norethindrone  :   ethinyl estradiol-norethindrone ; Status:   Prescribed ; Ordered As Mnemonic:   Loestrin 21 1/20 oral tablet ; Simple Display Line:   1 tab(s), PO, Daily, 84 tab(s), 3 Refill(s) ; Ordering Provider:   Jazmin Vargas MD; Catalog Code:   ethinyl estradiol-norethindrone ; Order Dt/Tm:   8/30/2018 6:03:20 PM

## 2022-02-15 NOTE — LETTER
(Inserted Image. Unable to display)       January 17, 2019      SENG SCHRADER  [id number] [place], [place] [id number]          Dear SENG,      Thank you for selecting [facility] for your healthcare needs.     Our records indicate you are due for the following services:     Follow-up Office Visit    To schedule an appointment or if you have further questions, please contact your primary clinic:   [facility]       [phone]   [facility]       [phone]              [facility]     [phone]    Powered by [email or web address]    Sincerely,    Jazmin Vargas MD

## 2022-02-15 NOTE — LETTER
(Inserted Image. Unable to display)   June 16, 2021  SENG SCHRADER  [id number] [place] [address]        Dear SENG,    Thank you for selecting [facility] for your healthcare needs.    Our records indicate you are due for the following services:     Follow-up office visit      (FYI   Regarding office visits: In some instances, a video visit or telephone visit may be offered as an option.)    To schedule an appointment or if you have further questions, please contact your clinic at [phone].    Powered by [employer] and [employer]    Sincerely,    Jazmin Vargas MD

## 2022-02-15 NOTE — PROGRESS NOTES
Patient Information     Name:SENG SCHRADER      Address:      [id number] [place] [id number]     Sex:Female     YOB: 2000     [phone]     Emergency Contact:RAGHU SCHRADER     MRN:[id number]     FIN:[id number]     Location:[facility]     Date of Service:01/08/2021      Primary Care Physician:       Jazmin Vargas MD, [phone]      Attending Physician:       Jazmin Vargas MD, [phone]  Subjective       recurring vaginal discharge.  Occurs about once a month.  Will have some greenish cottage cheesy discharge with some associated itching.  Did start while she was still living with her significant other in the meantime they have broken up.  She has had 1 new partner.  They did use condoms.  It does not seem to be related to intercourse or her menstrual cycle.  She does not have the discharge at this time but does have some external genital burning.       contraceptive counseling she is currently using oral contraceptive pill and reports she does remember to take it every day at the same time however she is interested in contraception that is even more reliable that an oral contraceptive pill.  We did discuss IUDs, Nexplanon, and Depo-Provera.  She is considering a Nexplanon.       ADHD her symptoms have been well controlled on Adderall 25 mg extended release daily however she had a change in her insurance and we have tried both Vyvanse and methylphenidate.  She did not tolerate the Vyvanse it made her more irritable.  She also feels like the methylphenidate has been ineffective.  She feels a little spaced out and altered when she does take the medication.  We did call over to her pharmacy today to find out if she would be able to qualify for Adderall with her insurance and we were told that Adderall no longer needed prior authorization under [facility] however I did later receive a fax stating that brand-name Adderall was on the patient's  insurance plan but only after a trial of alternative medication first.       She also reports that she was recently seen for tonsillitis and was told to follow-up with me for her tonsillitis she reports that at this time the symptoms have resolved.       She had COVID-19 in the middle of December.  Her symptoms have improved.  Given the tonsillitis.  She does report continued decreased exercise tolerance.  Objective   Vitals & Measurements    T: 97.8  F (Tympanic)  HR: 88 (Peripheral)  BP: 120/72  WT: 157.6 lb    Physical Exam        General alert and oriented no acute distress        HEENT mucous membranes moist and pink,        Chest has bilateral rise no increased work of breathing clear to auscultation no wheezes rhonchi rales        Cardiovascular normal tissue perfusion        Psych dressed appropriately good eye contact normal speech pattern good insight and judgment   Lab Results        Lab Results (Last 4 results within 90 days)         Coronavirus SARS-CoV-2 (COVID-19) TR: Positive (12/15/20 15:40:00)        Group A Strep POC: NOT DETECTED (01/02/21 11:18:00)        Culture Strep A: See comment (01/02/21 11:27:00)  Assessment/Plan       ADD (attention deficit disorder) (F98.8)         I did send over a new prescription for Adderall XR.  On the first prescription I am not sure that I included the correct diagnosis code so I did send it the second time.  I checked the [place] prescription drug monitoring database and there was no red flag activity.         Ordered:          amphetamine-dextroamphetamine, = 1 cap(s) ( 25 mg ), PO, qAM, Instructions: fill on or after 02/06/2021, # 30 cap(s), 0 Refill(s), Type: Maintenance, Pharmacy: [id number], 1 cap(s) Oral qam,Instr:fill on or after 02/06/2021, 64, in, 01/08/21 9:05:00 CST, Height Bhavana..., (Ordered)          amphetamine-dextroamphetamine, = 1 cap(s) ( 25 mg ), PO, qAM, # 30 cap(s), 0 Refill(s), Type: Maintenance, Pharmacy: [facility] #[id number], 1 cap(s) Oral qam, 64, in, 01/08/21 9:05:00 CST, Height Measured, 157.6, lb, 01/08/21 9:05:00 CST, Weight Measured, (Ordered)                Vaginal discharge (N89.8)         We will check a wet prep today.  We will also send off gonorrhea and chlamydia.  We will come up with a treatment plan based on these results.         Ordered:          Chlamydia/Neisseria gonorrhoeae RNA, TMA* ([facility]), Specimen Type: Urine, Collection Date: 01/08/21 9:38:00 CST          Wet Prep Vaginal (Request), Priority: Urgent, Vaginal discharge                Orders:         methylphenidate, = 1.5 tab(s) ( 15 mg ), Oral, bid, # 90 tab(s), 0 Refill(s), Type: Maintenance, Pharmacy: [facility], 1.5 tab(s) Oral bid, (Completed)       Tonsillitis now seems resolved,       COVID-19 pneumonia and December with some continued decreased exercise tolerance would like patient to continue to give this some time and monitor symptoms over the next 6 months.  She should return to clinic if her symptoms worsen or do not improve.       Time spent preparing for appointment 4 minutes       Time spent in the room with patient 32 minutes       Time spent charting 11 minutes

## 2022-02-15 NOTE — PROGRESS NOTES
Patient:   SENG SCHRADER            MRN: [id number]            FIN: [id number]               Age:   16 years     Sex:  Female     :  2000   Associated Diagnoses:   Strep pharyngitis   Author:   Jessica Gonzalez      Chief Complaint   3/22/2017 3:32 PM CDT    Patient is here with c/o sore throat and headache since yesterday.      History of Present Illness   Chief complaint reviewed and confirmed with patient.              The patient presents with a sore throat.  The sore throat is described as sharp pain.  The severity of the sore throat is severe.  The timing/course of the sore throat is constant.  The sore throat has lasted for 24 hour(s).  Exacerbating factors consist of none.  Relieving factors consist of none.  Associated symptoms consist of Painful cervical adenopathy.        Review of Systems   Constitutional:  Negative.    Eye:  Negative.    Ear/Nose/Mouth/Throat:  Negative except as documented in history of present illness.    Respiratory:  Negative.    Cardiovascular:  Negative.    Musculoskeletal:  Negative.       Health Status   Allergies:    Allergic Reactions (Selected)  No known allergies   Medications:  (Selected)   Prescriptions  Prescribed  erythromycin 2% topical gel: See Instructions, Instructions: APPLY TWICE DAILY, # 30 gm, 0 Refill(s), Pharmacy: [facility] #[id number], APPLY TWICE DAILY   Problem list:    All Problems  ADD (Attention Deficit Disorder) / ICD-9-.00 / Confirmed  Viral pharyngitis / ICD-9- / Confirmed      Histories   Past Medical History:    Active  Viral pharyngitis (462)  ADD (Attention Deficit Disorder) (314.00)  Comments:  2011 CDT 11:51 AM CDT - Lorena Hood  'Possible'   Family History:    Gastro-esophageal reflux disease  Mother ()  Suicide..  Mother ()     Procedure history:    No active procedure history items have been selected or recorded.   Social History:        Tobacco Assessment            Never, Household tobacco  concerns: No.      Substance Abuse Assessment            Never      Home and Environment Assessment            Lives with Father, Stepmother.  Substance abuse in household: No.  Smoker in household: No.  Risks in               environment: Does not wear helmet.      Sexual Assessment            Sexually active: No.        Physical Examination   Vital Signs   3/22/2017 3:32 PM CDT Temperature Tympanic 98.5 DegF    Peripheral Pulse Rate 82 bpm    HR Method Electronic    Systolic Blood Pressure 120 mmHg    Diastolic Blood Pressure 77 mmHg    Mean Arterial Pressure 91 mmHg    BP Site Right arm    BP Method Electronic      Measurements from flowsheet : Measurements   3/22/2017 3:32 PM CDT    Weight Measured - Standard                168 lb     General:  Alert and oriented, Moderate distress.    Eye:  Normal conjunctiva.    HENT:       Throat: Pharynx ( Posterior, Erythematous, Exudate ).    Neck:  Supple.         Lymph nodes: Bilateral, Cervical chain, Palpable, Tender.    Respiratory:  Lungs are clear to auscultation.    Cardiovascular:  Normal rate, Regular rhythm.       Impression and Plan   Diagnosis     Strep pharyngitis (DGZ69-ZE J02.0).     Plan:  I reviewed the Modified Centor Score is >/= 4: Risk of strep pharyngitis 51-53 %, discussed empiric treatment with antibiotics, I reviewed the Modified Centor Score is 1-3, so rapid strep culture was performed today (see above for results).         Refer to: 1) Penicillin for 10 days  2) Tylenol or Motrin for pain  3) Warm salt water gargle  4) RTC if symptoms worsen.    Patient Instructions:       Counseled: Patient, Family, Regarding medications, Verbalized understanding.

## 2022-02-15 NOTE — PROGRESS NOTES
Patient:   SENG SCHRADER            MRN: [id number]            FIN: [id number]               Age:   19 years     Sex:  Female     :  2000   Associated Diagnoses:   Head cold; Pharyngitis   Author:   Steven Katz PA-C      Chief Complaint   2019 11:15 AM CST   Pt here for sore throat and sinus pressure x 3-4 days.      History of Present Illness   Chief complaint and symptoms noted above and confirmed with patient   no fevers  no treatments      Review of Systems   Constitutional:  Negative.    Ear/Nose/Mouth/Throat:  Sore throat, sinus pressure.    Respiratory:  Cough, Sputum production.       Health Status   Allergies:    Nonallergic Reactions (Selected)  Moderate  Vyvanse (Irritability)   Problem list:    All Problems  Uses oral contraception / SNOMED CT 46356139 / Confirmed  ADD (attention deficit disorder) / SNOMED CT 6682124011 / Confirmed  Concussion / SNOMED CT 6434239629 / Confirmed  Needle phobia / SNOMED CT 074287392 / Confirmed  Syncope / SNOMED CT 088415269 / Confirmed  ADHD (attention deficit hyperactivity disorder) / SNOMED CT 18607320 / Confirmed  Resolved: Viral pharyngitis / SNOMED CT 1066048   Medications:  (Selected)   Prescriptions  Prescribed  Concerta 18 mg/24 hr oral tablet, extended release: = 1 tab(s) ( 18 mg ), Oral, qam, # 30 tab(s), 0 Refill(s), Type: Maintenance, Pharmacy: [facility], 1 tab(s) Oral qam  Loestrin 21  oral tablet: 1 tab(s), PO, Daily, # 84 tab(s), 3 Refill(s), Type: Maintenance, Pharmacy: [facility], 1 tab(s) Oral daily  Vyvanse 30 mg oral capsule: 1 cap(s), Oral, qam, # 30 unknown unit, Type: Soft Stop, Pharmacy: [facility] [place]      Histories   Past Medical History:    Active  ADD (attention deficit disorder) (3674615543)  Comments:  2011 CDT 11:51 AM JENNIFERT - Lorena Hood  'Possible'  Resolved  Viral pharyngitis (4187405):  Resolved.   Family History:    Gastro-esophageal reflux disease  Mother  ()  Suicide..  Mother ()     Procedure history:    No previous procedures.   Social History:        Alcohol Assessment            Never      Tobacco Assessment            Never, Household tobacco concerns: No.      Substance Abuse Assessment            Never      Home and Environment Assessment            Lives with Father, Stepmother.  Substance abuse in household: No.  Smoker in household: No.  Risks in               environment: Does not wear helmet.      Sexual Assessment            Sexually active: No.        Physical Examination   Vital Signs   2019 11:15 AM CST Temperature Tympanic 97.8 DegF  LOW    Peripheral Pulse Rate 88 bpm    Pulse Site Radial artery    Respiratory Rate 16 br/min    Systolic Blood Pressure 106 mmHg    Diastolic Blood Pressure 76 mmHg    Mean Arterial Pressure 86 mmHg    BP Site Right arm    Oxygen Saturation 99 %      Measurements from flowsheet : Measurements   2019 11:15 AM CST Height Measured - Standard 64 in    Weight Measured - Standard 152 lb    BSA 1.76 m2    Body Mass Index 26.09 kg/m2    Body Mass Index Percentile 85.88      General:  No acute distress.    HENT:  Tympanic membranes are clear, maxillary sinus tenderness, ooropharynx mildly enlarged and inflamed without exudate, nares are patent.    Neck:  Supple, Non-tender, No lymphadenopathy.    Respiratory:  Lungs are clear to auscultation.       Review / Management   Results review:       Interpretation: rapid strep is negative; culture pending, will call if abnormal results..       Impression and Plan   Diagnosis     Head cold (FQV30-OQ J00).     Pharyngitis (EYU93-SG J02.9).     Summary:  Fluids, salt water gargles, popsicles,  throat lozenges, NSAIDS, expectorants/decongestants; follow up if not improving.    Orders     Orders   Charges (Evaluation and Management):  [id number] office outpatient visit 15 minutes (Charge) (Order): Quantity: 1, Pharyngitis  Head cold.

## 2022-02-15 NOTE — TELEPHONE ENCOUNTER
---------------------From: Rosibel Joyner CMA (Phone Messages Pool ([id number])) To: [facility] Message Pool ([id number]);   Sent: 6/19/2019 2:36:15 PM CDTSubject: General Message-Adderall refill Phone MessagePCP:   [facility]  Time of Call:  1319   Person Calling:  Robbie (lesley)Phone number:  [phone], andriy HOOD Returned call at: _Note:   Calling to get refill of her Adderall.  RTC placed for 1monthnote states: ADD  Discussed with use of stimulant will require close monitoring. Most common side effects are anorexia (80%), sleep disturbance and weight loss. Tic behaviors are more unusual but can occur. Discussed that at times dose adjustment needs to be made and there is a possibility of addiction behavior.  Given prescription for this month and prescription for fill in 1 monthLast office visit and reason:  5/15[facility]---------------------From: Suri Delacruz LPN ([facility] Message Pool ([id number])) To: Jazmin Vargas MD;   Sent: 6/19/2019 2:53:44 PM CDTSubject: FW: General Message-Adderall refill please advise---------------------From: Jazmin Vargas MD To: [facility] Message Pool ([id number]);   Sent: 6/19/2019 4:29:06 PM CDTSubject: RE: General Message-Adderall refill only 1 month was sent, I addended the note, and sent this month's Rx, thanks's lesley Robbie HOOD at 4:38pm stating pt does not have insurance and they just found out that Rx for Adderall would be cheaper at [facility] [place].Robbie says they are not picking up Rx at [facility] and would like Rx to be sent to [facility] instead.[phone]---------------------From: Alice Lucas CMA ([facility] Message Pool (32224_[facility])) To: Jazmin Vargas MD;   Sent: 6/21/2019 4:41:06 PM CDTSubject: FW: General Message-Adderall refill Please change rx to [facility].---------------------From: Jazmin Vargas MD To: [facility] Message Pool ([id number]_[facility]);   Sent: 6/22/2019 4:27:22 PM CDTSubject: RE: General Message-Adderall refill  please faraz mao know I can't send electronically right now, will need to  a paper Rx to take or  at [facility], just let me know what they prefer.  will write a Rx for  but it can be tossed if they plan to go to [facility] to patients mom at [phone]. They will  rx on Monday.

## 2022-02-15 NOTE — PROGRESS NOTES
Chief Complaint    ADHD check/refill.  History of Present Illness       patient present to clinic today for follow up of ADD/ADHD.  pt reports current medication does not seem to be very effective, denies adverse side effects, no headache, chest pain, nausea, insomnia.  would like to try increasing current dose.       The medicationfails to improveability to function at work and at home at the current dose.       denies insomnia or headaches.       [facility]  Urine drug screen is up to date.      Review of systems is negative except as per HPI including:  no fevers, chills, sore throat, runny nose, nausea, vomiting, constipation, diarrhea, rash or new skin lesions, chest pain, palpitations, slurred speech, new paresthesia, shortness of breath or wheeze.      Exam:   also see vitals below      General: alert and oriented ×3 no acute distress.      HEENT: pupils are equal round and reactive to light extraocular motion is intact. Normocephalic and atraumatic.       Hearing is grossly normal and there is no otorrhea.       Nares are patent there is no rhinorrhea.       Mucous membranes are moist and pink.      Chest: has bilateral rise with no increased work of breathing.      Cardiovascular: normal perfusion and brisk capillary refill.      Musculoskeletal: no gross focal abnormalities and normal gait.      Neuro: no gross focal abnormalities and memory seems intact.      Psychiatric: speech is clear and coherent and fluent. Patient dressed appropriately for the weather. Mood is appropriate and affect is full.                     Discussed with patient to return to clinic if symptoms worsen or do not improve and for next Annual exam.         ADD        Discussed with use of stimulant will require close monitoring.   Most common side effects are anorexia (80%), sleep disturbance  and weight loss.  Tic behaviors are more unusual but can occur.  Discussed that at times dose adjustment  needs to be made and there is a possibility of addiction behavior.        Given prescription for this month, resent as insurance was not happy with the diagnosis code      15 minutes spent with patient in direct face to face contact, > 50% of time spent counseling and coordinating care.          Physical Exam   Vitals & Measurements    T: 98.1   F (Tympanic)  HR: 82(Peripheral)  BP: 118/76  SpO2: 98%     WT: 155.6 lb   Assessment/Plan       ADD (attention deficit disorder) (F98.8)        rtc in 1 month to see if new dose is effective and well tolerated         Ordered:          methylphenidate, = 1 tab(s) ( 36 mg ), Oral, qam, # 30 tab(s), 0 Refill(s), Type: Maintenance, Pharmacy: [facility], 1 tab(s) Oral qam, (Ordered)          methylphenidate, = 1 tab(s) ( 36 mg ), Oral, qam, # 30 tab(s), 0 Refill(s), Type: Maintenance, Pharmacy: [facility], 1 tab(s) Oral qam, (Ordered)                Orders:         lisdexamfetamine, 1 cap(s), Oral, qam, # 30 unknown unit, Type: Soft Stop, Pharmacy: [facility], (Completed)         methylphenidate, = 1 tab(s) ( 18 mg ), Oral, qam, # 30 tab(s), 0 Refill(s), Type: Maintenance, Pharmacy: [facility], 1 tab(s) Oral qam, (Completed)  Patient Information     Name:SENG SCHRADER      Address:      [address] [id number]     Sex:Female     YOB: 2000     [phone]     Emergency Contact:RAGHU SCHRADER     MRN:[id number]     FIN:[id number]     Location:[facility]     Date of Service:01/07/2020      Primary Care Physician:       Jazmin Vargas MD, [phone]      Attending Physician:       Jazmin Vargas MD, [phone]  Problem List/Past Medical History    Ongoing     ADD (attention deficit disorder)       Comments: 'Possible'     ADHD (attention deficit hyperactivity disorder)     Concussion     Needle phobia     Syncope     Uses oral  contraception    Historical     Viral pharyngitis  Procedure/Surgical History     No previous procedures  Medications    Concerta 36 mg/24 hr oral tablet, extended release, 36 mg= 1 tab(s), Oral, qam    Concerta 36 mg/24 hr oral tablet, extended release, 36 mg= 1 tab(s), Oral, qam    Loestrin 21 1/20 oral tablet, 1 tab(s), Oral, daily, 3 refills  Allergies    Vyvanse (Irritability)  Social History    Smoking Status - 01/07/2020     Never smoker     Alcohol      Never, 09/05/2018     Home/Environment      Lives with Father, Stepmother. Substance abuse in household: No. Smoker in household: No. Risks in environment: Does not wear helmet., 08/11/2015     Sexual      Sexually active: No., 08/11/2015     Substance Abuse      Never, 08/11/2015     Tobacco      Never, Household tobacco concerns: No., 05/13/2011  Family History    Gastro-esophageal reflux disease: Mother.    Suicide..: Mother.  Immunizations      Vaccine Date Status          meningococcal conjugate vaccine 11/16/2018 Given          Hep A, pediatric/adolescent 11/16/2018 Given          influenza virus vaccine, inactivated 11/09/2018 Given          human papillomavirus vaccine 06/03/2014 Given          human papillomavirus vaccine 01/17/2014 Given          DTaP-IPV 01/17/2014 Recorded          tetanus/diphth/pertuss (Tdap) adult/adol 01/17/2014 Recorded          influenza (LAIV) 11/11/2013 Given          human papillomavirus vaccine 11/11/2013 Given          meningococcal conjugate vaccine 07/19/2012 Given          tetanus/diphth/pertuss (Tdap) adult/adol 07/19/2012 Given          influenza 09/22/2011 Given          influenza virus vaccine, inactivated 10/26/2010 Given          influenza, H1N1, live 01/18/2010 Recorded          influenza, H1N1, live 12/15/2009 Recorded          varicella 06/01/2009 Recorded          DTaP 07/20/2006 Recorded          MMR (measles/mumps/rubella) 07/20/2006 Recorded          IPV 07/20/2006 Recorded          DTaP 10/03/2002  Recorded          Hep B-Hib 10/03/2002 Recorded          varicella 07/11/2002 Recorded          MMR (measles/mumps/rubella) 07/11/2002 Recorded          pneumococcal (PCV7) 03/28/2002 Recorded          DTaP 01/03/2002 Recorded          pneumococcal (PCV7) 01/03/2002 Recorded          IPV 01/03/2002 Recorded          DTaP 2001 Recorded          Hep B-Hib 2001 Recorded          IPV 2001 Recorded          DTaP 2001 Recorded          pneumococcal (PCV7) 2001 Recorded          Hep B-Hib 2001 Recorded          IPV 2001 Recorded  Lab Results          Lab Results (Last 4 results within 90 days)          Group A Strep POC: NOT DETECTED (12/09/19 11:41:00)          Culture Strep A: See comment (12/09/19 11:48:00)

## 2022-02-15 NOTE — LETTER
(Inserted Image. Unable to display)                                                                                                [address] [facility] [address]                                                January 31, 2019Re: SENG SCHRADER2000 [facility] [address] [place], [place] [address]:  [facility]The following patient has been referred to your office/practice:  SENG SCHRADERAppointment:  Appointment is PendingLocation:  [facility] refer to the attached  clinical documentation for a summary of SENG's care.  Please do not hesitate to contact our office if any additional clinical questions arise.  All relevant records and transition of care documents should be mailed or faxed.Your assistance in providing continuity of care is appreciated. Sincerely, [facility] & [place] [place] [address](P) [phone](F) [phone]

## 2022-02-15 NOTE — PROGRESS NOTES
Chief Complaint    Sore throat, fever started yesterday.  Positive covid on 12/15.  History of Present Illness      Chief complaint as above reviewed and confirmed with patient.  Pt presents to the clinic with concerns re: sore throat and fever x 2 days.  fever undocumented but had shaking chills and sweats through the night.  No rhinorrhea, congestion or cough.  Does have a HA. Had a co worker with strep a week ago but no close contact.  No vomiting or diarrhea.  No rash.  No hx of mono.  No excessive fatigue or problems with abdominal pain.       Had Covid 19 2 weeks ago and has resolved sx from that.  Works at [employer].  Review of Systems      Review of systems is negative with the exception of those noted in HPI          Physical Exam   Vitals & Measurements    T: 97.4  F (Tympanic)  HR: 102 (Peripheral)  BP: 120/79  SpO2: 98%     HT: 64 in           Vitals as above per nursing documentation           Constitutional : nad appears well          Ears: ears patent B, TMS intact, noninjected           Nose: nasal mucosa is non-edematous. no discharge           Throat: pharynx is erythematous, 1+ tonsillar hypertrophy, with B exudate           Neck: neck supple, small tender anterior cervical and posterior cervical adenopathy, no thyromegaly, no rigidity           skin:  No rashes            RST negative   Assessment/Plan       1. Tonsillitis (J03.90)         await strep culture. push fluids.  obtain thermometer and monitor temp.  FU with me in 1 week, consider monotest if persisting. May cancel appt if sx resolve.       Orders:         POC, GROUP A STREP* ([facility]), Specimen Type: Swab, Collection Date: 01/02/21 11:09:00 CST  Patient Information     Name:SENG SCHRADER      Address:      [id number] [place] [id number]     Sex:Female     YOB: 2000     [phone]     Emergency Contact:RAGHU SCHRADER     MRN:[id number]     FIN:[id number]     Location:[facility]     Date of Service:01/02/2021      Primary Care Physician:       Jazmin Vargas MD, [phone]      Attending Physician:       Bijal Bhatt PA-C, [phone]  Problem List/Past Medical History    Ongoing     ADD (attention deficit disorder)       Comments: 'Possible'     ADHD (attention deficit hyperactivity disorder)     ADHD (attention deficit hyperactivity disorder), inattentive type     Concussion     Needle phobia     Syncope     Uses oral contraception    Historical     Viral pharyngitis  Procedure/Surgical History     No previous procedures        Medications    ethinyl estradiol-norethindrone 20 mcg-1 mg oral tablet, 1 tab(s), Oral, daily    methylphenidate 10 mg oral tablet, 15 mg= 1.5 tab(s), Oral, bid    methylphenidate 10 mg oral tablet, 15 mg= 1.5 tab(s), Oral, bid  Allergies    Vyvanse (Irritability)  Social History    Smoking Status     Never smoker     Alcohol      Never     Electronic Cigarette/Vaping      Electronic Cigarette Use: Never.     Home/Environment      Lives with Father, Stepmother. Substance abuse in household: No. Smoker in household: No. Risks in environment: Does not wear helmet.     Sexual      Sexually active: No.     Substance Abuse      Never     Tobacco      Never (less than 100 in lifetime)  Family History    Gastro-esophageal reflux disease: Mother.    Suicide..: Mother.  Immunizations      Vaccine Date Status          meningococcal conjugate vaccine 11/16/2018 Given          Hep A, pediatric/adolescent 11/16/2018 Given          influenza virus vaccine, inactivated 11/09/2018 Given          human papillomavirus vaccine 06/03/2014 Given          human papillomavirus vaccine 01/17/2014 Given          tetanus/diphth/pertuss (Tdap) adult/adol 01/17/2014 Recorded          DTaP-IPV 01/17/2014 Recorded          influenza (LAIV) 11/11/2013 Given          human papillomavirus vaccine 11/11/2013 Given          meningococcal conjugate vaccine 07/19/2012 Given           tetanus/diphth/pertuss (Tdap) adult/adol 07/19/2012 Given          influenza 09/22/2011 Given          influenza virus vaccine, inactivated 10/26/2010 Given          influenza, H1N1, live 01/18/2010 Recorded          influenza, H1N1, live 12/15/2009 Recorded          varicella 06/01/2009 Recorded          MMR (measles/mumps/rubella) 07/20/2006 Recorded          IPV 07/20/2006 Recorded          DTaP 07/20/2006 Recorded          Hep B-Hib 10/03/2002 Recorded          DTaP 10/03/2002 Recorded          varicella 07/11/2002 Recorded          MMR (measles/mumps/rubella) 07/11/2002 Recorded          pneumococcal (PCV7) 03/28/2002 Recorded          pneumococcal (PCV7) 01/03/2002 Recorded          IPV 01/03/2002 Recorded          DTaP 01/03/2002 Recorded          Hep B-Hib 2001 Recorded          IPV 2001 Recorded          DTaP 2001 Recorded          pneumococcal (PCV7) 2001 Recorded          Hep B-Hib 2001 Recorded          IPV 2001 Recorded          DTaP 2001 Recorded  Lab Results       Lab Results (Last 4 results within 90 days)        Coronavirus SARS-CoV-2 (COVID-19) TR: Positive (12/15/20 15:40:00)       Group A Strep POC: NOT DETECTED (01/02/21 11:18:00)

## 2022-02-15 NOTE — TELEPHONE ENCOUNTER
Entered by Aurora Hines on December 15, 2020 3:34:31 PM CST  pt scheduled      ---------------------  From: Joaquina Gallego CMA   To: Appointment Pool ([id number]);     Sent: 12/15/2020 3:29:46 PM CST  Subject: COVID     please contact pt to set up curbside per SALLY

## 2022-02-15 NOTE — PROGRESS NOTES
Patient:   SENG SCHRADER            MRN: [id number]            FIN: [id number]               Age:   21 years     Sex:  Female     :  2000   Associated Diagnoses:   Achilles tendinitis; Routine screening for STI (sexually transmitted infection); Vaginal odor   Author:   Garima Chavarria      Chief Complaint   2021 2:18 PM CST    would like to have some STI testing done, denies any current symptoms        History of Present Illness   here for STI screening, she and steady partner use condoms, hormonal methods have not worked well in the past due to 'PTSD'  She wants STI screens but also thinks she has BV, has never had a pelvic exam. For many months she gets vaginal DC monthly, this time it is around her menses which started 4 days ago. She uses tampons and will remove today for exam    she has sore left achilles. Started as a  at [employer] in [place]. SHe has worked a week, the first day was in very unsupportive footwear, now she has better footwear. using tylenol      Health Status   Allergies:    Nonallergic Reactions (Selected)  Moderate  Vyvanse (Irritability)  Severity Not Documented  Concerta (Anxiety and irritability)  Methylphenidate (Anxiety and irritability)   Medications:  (Selected)   Prescriptions  Prescribed  Adderall XR 25 mg oral capsule, extended release: = 1 cap(s) ( 25 mg ), Oral, qam, # 30 cap(s), 0 Refill(s), Type: Maintenance, Pharmacy: [facility]), 1 cap(s) Oral qam, 64, in, 21 9:16:00 CST, Height Measured, 155, lb, 10/05/21 15:30:00 CDT, Weight Measured  Adderall XR 25 mg oral capsule, extended release: = 1 cap(s) ( 25 mg ), Oral, qam, Instructions: may fill in 28 days, # 30 cap(s), 0 Refill(s), Type: Maintenance, Pharmacy: [facility]), 1 cap(s) Oral qam,Instr:may fill in 28 days, 64, in, 21 9:16:00 CST, Height Measured, 155...   Problem list:    All Problems  Syncope / SNOMED CT 242495771 / Confirmed  Uses oral  contraception / SNOMED CT 01903802 / Confirmed  Needle phobia / SNOMED CT 435113076 / Confirmed  History of COVID-19 / SNOMED CT 6165036309 / Confirmed  Concussion / SNOMED CT 6566071361 / Confirmed  Chronic post-traumatic stress disorder (PTSD) / SNOMED CT 603613452 / Confirmed  ADHD (attention deficit hyperactivity disorder) / SNOMED CT 27729219 / Confirmed  ADHD (attention deficit hyperactivity disorder), inattentive type / SNOMED CT 02480751 / Confirmed  Resolved: Viral pharyngitis / SNOMED CT 7820944  Canceled: ADD (attention deficit disorder) / SNOMED CT 2843706053  'Possible'      Histories   Past Medical History:    Resolved  Viral pharyngitis (2056054):  Resolved.   Family History:    Gastro-esophageal reflux disease  Mother ()  Suicide..  Mother ()     Procedure history:    No previous procedures.   Social History:        Electronic Cigarette/Vaping Assessment            Electronic Cigarette Use: Never.      Alcohol Assessment            Never      Tobacco Assessment            Never, Household tobacco concerns: No.            Never (less than 100 in lifetime)      Substance Abuse Assessment            Never      Home and Environment Assessment            Lives with Father, Stepmother.  Substance abuse in household: No.  Smoker in household: No.  Risks in               environment: Does not wear helmet.      Sexual Assessment            Sexually active: No.        Physical Examination   Vital Signs   2021 2:18 PM CST Temperature Tympanic 97.9 DegF    Peripheral Pulse Rate 60 bpm    Systolic Blood Pressure 110 mmHg    Diastolic Blood Pressure 62 mmHg    Mean Arterial Pressure 78 mmHg    BP Site Right arm    BP Method Manual    Oxygen Saturation 99 %      Measurements from flowsheet : Measurements   2021 2:18 PM CST Height Measured - Standard 64 in    Height/Length Estimated 64 in    Weight Measured - Standard 158 lb    BSA 1.8 m2    Body Mass Index 27.12 kg/m2  HI      General:   Alert and oriented.    Genitourinary:  No inguinal tenderness, No urethral discharge, No lesions, pelvic exam reveals no inguinal lymph nodes palpable, no external lesions, no odor no discharge. Introitus normal with vault with normal rugae, cervix visualized and clear. No cervical motion tenderness, no adnexal tenderness or masses  uterus/cervix sit deep and to the left  minimal dc in vault.    Integumentary:  Warm, Pink.       Impression and Plan   Diagnosis     Achilles tendinitis (MTD77-VE M76.60).     Routine screening for STI (sexually transmitted infection) (BSF49-OD Z11.3).     Vaginal odor (SQN96-EG N89.8).     Patient Instructions:       Counseled: Patient, Regarding diagnosis, Regarding treatment, Regarding medications, Verbalized understanding.    Orders     Orders (Selected)   Outpatient Orders  Ordered (In Transit)  Chlamydia/Neisseria gonorrhoeae RNA, TMA* ([facility]): Specimen Type: Swab, Collection Date: 12/06/21 14:36:00 CST  HIV-1/2 Antigen and Antibodies, Fourth Generation, with Reflexes* ([facility]): Specimen Type: Serum, Collection Date: 12/06/21 14:36:00 CST  Hepatitis C Antibody* ([facility]): Specimen Type: Serum, Collection Date: 12/06/21 14:36:00 CST  RPR (dx) w/refl titer and confirmatory testing* ([facility]): Specimen Type: Serum, Collection Date: 12/06/21 14:36:00 CST  Completed  Wet Prep Vaginal (Request): Priority: STAT, ABN: Not Required, Vaginal odor.     ibuprofen rather than tylenol for pain, use ice bid as counseled  await STI testing.

## 2022-02-15 NOTE — TELEPHONE ENCOUNTER
---------------------  From: Nena Rodriguez MD   To: SENG SCHRADER    Sent: 11/17/2021 7:27:12 PM CST  Subject: General Message     Strep culture is negative.      Results:  Date Result Name Value   11/15/2021 5:39 PM Culture Strep A See comment

## 2022-02-15 NOTE — TELEPHONE ENCOUNTER
---------------------  From: Mike SNOWDEN, Kayla SAMANIEGO (Phone Messages Pool ([id number]_[facility]))   To: SENG SCHRADER    Sent: 1/27/2021 8:15:01 AM CST  Subject: FW: Tests     Bong Porter,    What tests are you referring to? It looks like Dr. Vargas sent a portal message to you on 1/15 letting you know the chlamydia and gonorrhea tests came back normal. Was this what you were looking for?    Kayla BANEGAS LPN        ---------------------  From: SENG SCHRADER  To: [facility] [place]  Sent: 01/26/2021 09:23 p.m. CST  Subject: Tests  Jazmin,  were the tests fine?

## 2022-02-15 NOTE — TELEPHONE ENCOUNTER
---------------------  From: Sherita Fernandez CMA   To: SENG SCHRADER    Sent: 10/6/2021 10:45:15 AM CDT  Subject: strep results     Bong Porter,    The rapid strep from yesterday was negative but Dr. Espinoza would like you to still take the antibiotic he sent.     Kind Regards,    SHIKHA Magallon

## 2022-02-15 NOTE — NURSING NOTE
Comprehensive Intake Entered On:  11/26/2019 12:46 PM CST    Performed On:  11/26/2019 12:37 PM CST by Suri Delacruz LPN               Summary   Chief Complaint :   Med check. due to the new medication pt feels lanier all the time and PTSD and anxiety are worse   Menstrual Status :   Menarcheal   Weight Measured :   148.4 lb(Converted to: 148 lb 6 oz, 67.31 kg)    Systolic Blood Pressure :   120 mmHg   Diastolic Blood Pressure :   80 mmHg   Mean Arterial Pressure :   93 mmHg   Peripheral Pulse Rate :   88 bpm   Temperature Tympanic :   97.8 DegF(Converted to: 36.6 DegC)  (LOW)    Suri Delacruz LPN - 11/26/2019 12:37 PM CST   Health Status   Allergies Verified? :   Yes   Medication History Verified? :   Yes   Medical History Verified? :   Yes   Pre-Visit Planning Status :   Completed   Tobacco Use? :   Never smoker   Suri Delacruz LPN - 11/26/2019 12:37 PM CST   Consents   Consent for Immunization Exchange :   Consent Granted   Consent for Immunizations to Providers :   Consent Granted   Suri Delacruz LPN - 11/26/2019 12:37 PM CST   Meds / Allergies   (As Of: 11/26/2019 12:46:19 PM CST)   Allergies (Active)   No Known Medication Allergies  Estimated Onset Date:   Unspecified ; Created By:   Deanne Schmitt CMA; Reaction Status:   Active ; Category:   Drug ; Substance:   No Known Medication Allergies ; Type:   Allergy ; Updated By:   Deanne Schmitt CMA; Reviewed Date:   11/26/2019 12:44 PM CST        Medication List   (As Of: 11/26/2019 12:46:19 PM CST)   Prescription/Discharge Order    ethinyl estradiol-norethindrone  :   ethinyl estradiol-norethindrone ; Status:   Prescribed ; Ordered As Mnemonic:   Loestrin 21 1/20 oral tablet ; Simple Display Line:   1 tab(s), PO, Daily, 84 tab(s), 3 Refill(s) ; Ordering Provider:   Jazmin Vargas MD; Catalog Code:   ethinyl estradiol-norethindrone ; Order Dt/Tm:   9/13/2019 12:20:42 PM CDT          Vyvanse 30 mg oral capsule  :   Vyvanse 30 mg oral capsule ; Status:   Prescribed  ; Ordered As Mnemonic:   Vyvanse 30 mg oral capsule ; Simple Display Line:   See Instructions, TAKE ONE CAPSULE BY MOUTH ONCE DAILY EVERY MORNING, 30 unknown unit ; Ordering Provider:   Jazmin Vargas MD; Catalog Code:   lisdexamfetamine ; Order Dt/Tm:   11/22/2019 5:44:22 PM CST          Vyvanse 30 mg oral capsule  :   Vyvanse 30 mg oral capsule ; Status:   Prescribed ; Ordered As Mnemonic:   Vyvanse 30 mg oral capsule ; Simple Display Line:   1 cap(s), Oral, qam, 30 unknown unit ; Ordering Provider:   Jazmin Vargas MD; Catalog Code:   lisdexamfetamine ; Order Dt/Tm:   10/22/2019 6:14:33 PM CDT

## 2022-02-15 NOTE — TELEPHONE ENCOUNTER
---------------------  From: Sherita Fernandez CMA   To: [facility] Message Pool ([id number] - [place]);     Sent: 10/5/2021 3:42:22 PM CDT  Subject: Adderall refill     Per TFS - P to fill pt's next 2 months.   Last filled x 2 mo on 8/5/21 during last ADHD visit.---------------------  From: Yashira Gray ([place] Message Pool ([id number]_[facility]))   To: Jazmin Vargas MD;     Sent: 10/5/2021 4:07:36 PM CDT  Subject: FW: Adderall refill---------------------  From: Jazmin Vargas MD   To: [facility] Message Pool ([id number] - [place]);     Sent: 10/6/2021 1:49:01 PM CDT  Subject: RE: Adderall refill     ple let pt know this months and next month Rxs sent, need a follow up before the December refill
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
## 2022-02-15 NOTE — LETTER
(Inserted Image. Unable to display)     November 18, 2019      SENG MACRINA  [id number] [place], [place] [id number]          Dear SENG,      Thank you for selecting [facility] (previously [facility]) for your healthcare needs.      Our records indicate you are due for the following services:     Annual Physical and Gynecologic Exam ~ Yearly wellness exams are important for your ongoing health and wellness.  This exam gives you the opportunity to meet with your health care provider to review your health, update immunizations and to recommend preventive screenings that you may be due for.  At your wellness visit, your Healthcare Provider will determine the need for a gynecologic exam and/or Pap smear.      To schedule an appointment or if you have further questions, please contact your primary clinic:   Critical access hospital       [phone]   [facility]       [phone]              [facility]     [phone]      Powered by [employer]    Sincerely,    Jazmin Vargas MD

## 2022-02-15 NOTE — NURSING NOTE
Comprehensive Intake Entered On:  1/7/2020 2:23 PM CST    Performed On:  1/7/2020 2:19 PM CST by Alice Lucas CMA               Summary   Chief Complaint :   ADHD check/refill.    Last Menstrual Period :   1/3/2020 CST   Menstrual Status :   Menarcheal   Weight Measured :   155.6 lb(Converted to: 155 lb 10 oz, 70.58 kg)    Systolic Blood Pressure :   118 mmHg   Diastolic Blood Pressure :   76 mmHg   Mean Arterial Pressure :   90 mmHg   Peripheral Pulse Rate :   82 bpm   BP Site :   Right arm   BP Method :   Manual   HR Method :   Electronic   Temperature Tympanic :   98.1 DegF(Converted to: 36.7 DegC)    Oxygen Saturation :   98 %   Alice Lucas CMA - 1/7/2020 2:19 PM CST   Health Status   Allergies Verified? :   Yes   Medication History Verified? :   Yes   Pre-Visit Planning Status :   Completed   Tobacco Use? :   Never smoker   Alice Lucas CMA - 1/7/2020 2:19 PM CST   Consents   Consent for Immunization Exchange :   Consent Granted   Consent for Immunizations to Providers :   Consent Granted   Alice Lucas CMA - 1/7/2020 2:19 PM CST   Meds / Allergies   (As Of: 1/7/2020 2:23:41 PM CST)   Allergies (Active)   Vyvanse  Estimated Onset Date:   Unspecified ; Reactions:   Irritability ; Created By:   Jazmin Vargas MD; Reaction Status:   Active ; Category:   Drug ; Substance:   Vyvanse ; Type:   Side Effect ; Severity:   Moderate ; Updated By:   Jazmin Vargas MD; Reviewed Date:   12/9/2019 11:18 AM CST        Medication List   (As Of: 1/7/2020 2:23:41 PM CST)   Prescription/Discharge Order    ethinyl estradiol-norethindrone  :   ethinyl estradiol-norethindrone ; Status:   Prescribed ; Ordered As Mnemonic:   Loestrin 21 1/20 oral tablet ; Simple Display Line:   1 tab(s), PO, Daily, 84 tab(s), 3 Refill(s) ; Ordering Provider:   Jazmin Vargas MD; Catalog Code:   ethinyl estradiol-norethindrone ; Order Dt/Tm:   9/13/2019 12:20:42 PM CDT          methylphenidate  :   methylphenidate ; Status:   Prescribed ;  Ordered As Mnemonic:   Concerta 18 mg/24 hr oral tablet, extended release ; Simple Display Line:   18 mg, 1 tab(s), Oral, qam, 30 tab(s), 0 Refill(s) ; Ordering Provider:   Jazmin Vargas MD; Catalog Code:   methylphenidate ; Order Dt/Tm:   11/26/2019 1:09:06 PM CST

## 2022-02-15 NOTE — TELEPHONE ENCOUNTER
---------------------  From: Sherita Fernandez CMA (Phone Messages Pool ([id number]_[facility]))   To: [facility] Message Pool (32224_[place] [facility] [place]);     Sent: 1/25/2021 5:09:31 PM CST  Subject: med change/insurance     Phone Message    PCP:   EILEEN      Time of Call:  1524       Person Calling:  pt  Phone number:  [phone], ok LM    Returned call at: _    Note:   Pt states she is wanting to try Vyvanse again d/t insurance denying Adderall. Please let pt know when script is sent in.     Last office visit and reason:  1/8/21 [facility]---------------------  From: Yashira Gray ([facility] Message Pool ([id number]_[facility]))   To: Jazmin Vargas MD;     Sent: 1/26/2021 9:43:14 AM CST  Subject: FW: med change/insurance---------------------  From: Jazmin Vargas MD   To: SENG SCHRADER    Sent: 1/26/2021 2:48:38 PM CST  Subject: RE: med change/insurance     I sent over the Vyvanse, this time ao 20 mg instead of 30 to see if you tolerate it better, plus then we have adjusted the dose.

## 2022-02-15 NOTE — TELEPHONE ENCOUNTER
---------------------  From: Catrachita Clarke CMA (eRx Pool ([id number]_[facility]))   To: [facility] Message Pool (32224_[place] [facility] [place]);     Sent: 10/22/2019 2:53:37 PM CDT  Subject: FW: Medication Management   Due Date/Time: 10/23/2019 9:24:00 AM CDT     Medication Refill needing approval    PCP:   EILEEN    Medication:   Vyvanse  Last Filled:  9/18/19    Quantity:  30  Refills:  1  CSA on file?   9/13/19     Date of last office visit and reason:   10/9/19; UTI  Date of last labs pertaining to condition:  5/15/19    Return to Clinic order placed?  overdue for px        ------------------------------------------  From: [facility]  To: Jazmin Vargas MD  Sent: October 22, 2019 9:24:11 AM CDT  Subject: Medication Management  Due: October 23, 2019 9:24:11 AM CDT    ** On Hold Pending Signature **  Drug: lisdexamfetamine (Vyvanse 30 mg oral capsule)  TAKE ONE CAPSULE BY MOUTH ONCE DAILY EVERY MORNING  Quantity: 30 unknown unit  Days Supply: 0  Refills: 0  Substitutions Allowed  Notes from Pharmacy:     Dispensed Drug: lisdexamfetamine (Vyvanse 30 mg oral capsule)  TAKE ONE CAPSULE BY MOUTH ONCE DAILY EVERY MORNING  Quantity: 30 unknown unit  Days Supply: 0  Refills: 0  Substitutions Allowed  Notes from Pharmacy:   ---------------------------------------------------------------  From: Alice Lucas CMA ([facility] Message Pool ([id number]_[facility]))   To: Jazmin Vargas MD;     Sent: 10/22/2019 2:57:50 PM CDT  Subject: FW: Medication Management   Due Date/Time: 10/23/2019 9:24:00 AM CDT---------------------  From: Jazmin Vargas MD   To: [facility]    Sent: 10/22/2019 6:14:35 PM CDT  Subject: FW: Medication Management     ** Submitted: **  Complete:lisdexamfetamine (Vyvanse 30 mg oral capsule)   Signed by Jazmin Vargas MD  10/22/2019 6:14:00 PM    ** Submitted: **  Complete:lisdexamfetamine (Vyvanse 30 mg oral capsule)   Signed by Jazmin Vargas MD  10/22/2019 6:14:00 PM    **  Approved **  lisdexamfetamine (VYVANSE 30MG CAP  CAPSULE)  TAKE ONE CAPSULE BY MOUTH ONCE DAILY EVERY MORNING  Qty:  30 unknown unit        Days Supply:  0          Substitutions Allowed     Route To Pharmacy - [facility] called at 7:53am wondering if her Vyvanse Rx was filled yet. Informed her that Dr. Vargas did send in a refill on 10/22 to [facility] pharmacy and pt should be able to pick it up. Pt agrees and will call us back if needed. Ph# [phone]

## 2022-02-15 NOTE — PROGRESS NOTES
Pagefaheem 1855 and called back 1900. Mom called and stated picked up OCP pack 21 pills and no placebo pill and wondering when to start. She will be taking it for acne, painful periods and birth control as she has been sexually active and will be in future. Stated OCP can be started at any time to give the best birth control. Mom comfortable waiting and will start the 1st Sunday after the beginning of the period. Wants to start on Sunday since days are labelled with the pills and will make it easier to remember.

## 2022-02-15 NOTE — TELEPHONE ENCOUNTER
---------------------  From: Garima Chavarria   To: SENG SCHRADER    Sent: 12/8/2021 7:14:48 AM CST  Subject: General Message     All good news, the STI testing completed is negative.    BRIAN Gerardo      Results:  Date Result Name Value Ref Range   12/6/2021 2:47 PM HIV Ag/Ab NON-REACTIVE (NONREACTIVE - NONREACTIVE)   12/6/2021 2:47 PM Hep C Ab NON-REACTIVE (NONREACTIVE - NONREACTIVE)   12/6/2021 2:47 PM Hep C Signal to Cutoff 0.02 ( - <1.00)   12/6/2021 2:47 PM Chlamydia RNA NOT DETECTED (NOT DETECTED - )   12/6/2021 2:47 PM Neisseria gonorrhoeae RNA NOT DETECTED (NOT DETECTED - )   12/6/2021 2:47 PM Chlam/N. gonorrhea Comments See comment    12/6/2021 2:47 PM RPR Ql NON-REACTIVE (NONREACTIVE - NONREACTIVE)

## 2022-02-15 NOTE — PROGRESS NOTES
Chief Complaint    patient here today for an rx refill- adderall  History of Present Illness       patient present to clinic today for follow up of ADD/ADHD.  pt reports current medication is working well, denies adverse side effects, no headache, chest pain, nausea, insomnia.  would like to continue with current dose.       The medication improves  ability to function at work and at home.       denies insomnia or headaches.       [place] PMDP searched, no red flags noted.  Urine drug screen is up to date.      Review of systems is negative except as per HPI including:  no fevers, chills, sore throat, runny nose, nausea, vomiting, constipation, diarrhea, rash or new skin lesions, chest pain, palpitations, slurred speech, new paresthesia, shortness of breath or wheeze.      Exam:   also see vitals below      General: alert and oriented ×3 no acute distress.      HEENT: pupils are equal round and reactive to light extraocular motion is intact. Normocephalic and atraumatic.       Hearing is grossly normal and there is no otorrhea.       Nares are patent there is no rhinorrhea.       Mucous membranes are moist and pink.      Chest: has bilateral rise with no increased work of breathing.      Cardiovascular: normal perfusion and brisk capillary refill.      Musculoskeletal: no gross focal abnormalities and normal gait.      Neuro: no gross focal abnormalities and memory seems intact.      Psychiatric: speech is clear and coherent and fluent. Patient dressed appropriately for the weather. Mood is appropriate and affect is full.                     Discussed with patient to return to clinic if symptoms worsen or do not improve and for next Annual exam.         ADD        Discussed with use of stimulant will require close monitoring.   Most common side effects are anorexia (80%), sleep disturbance  and weight loss.  Tic behaviors are more unusual but can occur.  Discussed that at times dose adjustment needs to be made and  there is a possibility of addiction behavior.        Given prescription for this month and prescription for fill in 1 month        Can call for prescription in 2 month      Follow up in 4 months      15 minutes spent with patient in direct face to face contact, > 50% of time spent counseling and coordinating care.          Physical Exam   Vitals & Measurements    T: 97.9   F (Tympanic)  HR: 103(Peripheral)  BP: 120/70  SpO2: 98%     WT: 137.4 lb   Assessment/Plan       ADD (attention deficit disorder) (F98.8)         Ordered:          amphetamine-dextroamphetamine, = 1 cap(s) ( 25 mg ), PO, qAM, # 30 cap(s), 0 Refill(s), Type: Maintenance, Pharmacy: [facility], 1 cap(s) Oral qam, (Ordered)          amphetamine-dextroamphetamine, = 1 cap(s) ( 25 mg ), PO, qAM, Instructions: fill on or after 10/10/2019, # 30 cap(s), 0 Refill(s), Type: Maintenance, Pharmacy: [facility], 1 cap(s) Oral qam,Instr:fill on or after 10/10/2019, (Ordered)          49133 office outpatient visit 15 minutes (Charge), Quantity: 1, Uses oral contraception  ADD (attention deficit disorder)                Uses oral contraception (Z30.41)         Ordered:          36434 office outpatient visit 15 minutes (Charge), Quantity: 1, Uses oral contraception  ADD (attention deficit disorder)                Orders:         ethinyl estradiol-norethindrone, 1 tab(s), PO, Daily, # 84 tab(s), 3 Refill(s), Type: Maintenance, Pharmacy: [facility], 1 tab(s) Oral daily, (Ordered)         Physical Therapy Evaluation and Treatment (Request), Headache, tension-type  Concussion  Low back pain  Neck pain         Return to Clinic (Request), Return in 4 weeks  Patient Information     Name:SENG SCHRADER      Address:      [address] [address]     Sex:Female     YOB: 2000     [phone]     Emergency Contact:SANTO BRIZUELA     MRN:[id number]     FIN:[id number]      Location:[facility]     Date of Service:09/13/2019      Primary Care Physician:       Jazmin Vargas MD, [phone]      Attending Physician:       Jazmin Vargas MD, [phone]  Problem List/Past Medical History    Ongoing     ADD (attention deficit disorder)       Comments: 'Possible'     Concussion     Needle phobia     Syncope     Uses oral contraception    Historical     Viral pharyngitis  Procedure/Surgical History     No previous procedures        Medications    Adderall XR 25 mg oral capsule, extended release, 25 mg= 1 cap(s), Oral, qam    Adderall XR 25 mg oral capsule, extended release, 25 mg= 1 cap(s), Oral, qam    Loestrin 21 1/20 oral tablet, 1 tab(s), Oral, daily, 3 refills  Allergies    No Known Medication Allergies  Social History    Smoking Status - 09/13/2019     Never smoker     Alcohol      Never, 09/05/2018     Home/Environment      Lives with Father, Stepmother. Substance abuse in household: No. Smoker in household: No. Risks in environment: Does not wear helmet., 08/11/2015     Sexual      Sexually active: No., 08/11/2015     Substance Abuse      Never, 08/11/2015     Tobacco      Never, Household tobacco concerns: No., 05/13/2011  Family History    Gastro-esophageal reflux disease: Mother.    Suicide..: Mother.  Immunizations      Vaccine Date Status      meningococcal conjugate vaccine 11/16/2018 Given      Hep A, pediatric/adolescent 11/16/2018 Given      influenza virus vaccine, inactivated 11/09/2018 Given      human papillomavirus vaccine 06/03/2014 Given      human papillomavirus vaccine 01/17/2014 Given      DTaP-IPV 01/17/2014 Recorded      tetanus/diphth/pertuss (Tdap) adult/adol 01/17/2014 Recorded      influenza (LAIV) 11/11/2013 Given      human papillomavirus vaccine 11/11/2013 Given      meningococcal conjugate vaccine 07/19/2012 Given      tetanus/diphth/pertuss (Tdap) adult/adol 07/19/2012 Given      influenza 09/22/2011 Given      influenza  virus vaccine, inactivated 10/26/2010 Given      influenza, H1N1, live 01/18/2010 Recorded      influenza, H1N1, live 12/15/2009 Recorded      varicella 06/01/2009 Recorded      DTaP 07/20/2006 Recorded      MMR (measles/mumps/rubella) 07/20/2006 Recorded      IPV 07/20/2006 Recorded      DTaP 10/03/2002 Recorded      Hep B-Hib 10/03/2002 Recorded      varicella 07/11/2002 Recorded      MMR (measles/mumps/rubella) 07/11/2002 Recorded      pneumococcal (PCV7) 03/28/2002 Recorded      DTaP 01/03/2002 Recorded      pneumococcal (PCV7) 01/03/2002 Recorded      IPV 01/03/2002 Recorded      DTaP 2001 Recorded      Hep B-Hib 2001 Recorded      IPV 2001 Recorded      DTaP 2001 Recorded      pneumococcal (PCV7) 2001 Recorded      Hep B-Hib 2001 Recorded      IPV 2001 Recorded  Lab Results       Lab Results (Last 4 results within 90 days)        UA pH: 7 [5  - 8] (08/03/19 11:57:00)       UA Specific Gravity: 1.015 [1.001  - 1.035] (08/03/19 11:57:00)       UA Glucose: NEGATIVE [NEGATIVE  - NEGATIVE] (08/03/19 11:57:00)       UA Bilirubin: NEGATIVE [NEGATIVE  - NEGATIVE] (08/03/19 11:57:00)       UA Ketones: NEGATIVE [NEGATIVE  - NEGATIVE] (08/03/19 11:57:00)       Urine Occult Blood: 3+ Abnormal [NEGATIVE  - NEGATIVE] (08/03/19 11:57:00)       UA Protein: 2+ Abnormal [NEGATIVE  - NEGATIVE] (08/03/19 11:57:00)       UA Nitrite: NEGATIVE [NEGATIVE  - NEGATIVE] (08/03/19 11:57:00)       UA Leukocyte Esterase: 2+ Abnormal [NEGATIVE  - NEGATIVE] (08/03/19 11:57:00)       UA WBC Clumps: Present (08/03/19 12:10:00)       UA Epithelial Cells: Moderate [None  - Few] (08/03/19 12:10:00)       UA WBC:  [None  - 5] (08/03/19 12:10:00)       UA RBC: 11-25 [None  - 2] (08/03/19 12:10:00)       UA Bacteria: Many [None  - Few] (08/03/19 12:10:00)

## 2022-02-15 NOTE — NURSING NOTE
Comprehensive Intake Entered On:  8/3/2019 12:27 PM CDT    Performed On:  8/3/2019 12:22 PM CDT by Thelma Kahn RN               Summary   Chief Complaint :   Patient is here for UTI. c/o burning with urination, back pain. Started about a week ago.    Last Menstrual Period :   7/22/2019 CDT   Menstrual Status :   Menarcheal   Weight Measured :   137 lb(Converted to: 137 lb 0 oz, 62.14 kg)    Systolic Blood Pressure :   118 mmHg   Diastolic Blood Pressure :   70 mmHg   Mean Arterial Pressure :   86 mmHg   Peripheral Pulse Rate :   88 bpm   BP Site :   Right arm   BP Method :   Manual   HR Method :   Electronic   Temperature Tympanic :   97.4 DegF(Converted to: 36.3 DegC)  (LOW)    Thelma Kahn RN - 8/3/2019 12:22 PM CDT   Health Status   Allergies Verified? :   Yes   Medication History Verified? :   Yes   Pre-Visit Planning Status :   N/A   Tobacco Use? :   Never smoker   Thelma Kahn RN - 8/3/2019 12:22 PM CDT   Meds / Allergies   (As Of: 8/3/2019 12:27:05 PM CDT)   Allergies (Active)   No known allergies  Estimated Onset Date:   Unspecified ; Created By:   Suri Delacruz LPN; Reaction Status:   Active ; Category:   Drug ; Substance:   No known allergies ; Type:   Allergy ; Updated By:   Suri Delacruz LPN; Reviewed Date:   8/3/2019 12:25 PM CDT      No Known Medication Allergies  Estimated Onset Date:   Unspecified ; Created By:   Deanne Schmitt CMA; Reaction Status:   Active ; Category:   Drug ; Substance:   No Known Medication Allergies ; Type:   Allergy ; Updated By:   Deanne Schmitt CMA; Reviewed Date:   8/3/2019 12:25 PM CDT        Medication List   (As Of: 8/3/2019 12:27:05 PM CDT)   Prescription/Discharge Order    ethinyl estradiol-norethindrone  :   ethinyl estradiol-norethindrone ; Status:   Prescribed ; Ordered As Mnemonic:   Loestrin 21 1/20 oral tablet ; Simple Display Line:   1 tab(s), PO, Daily, 84 tab(s), 3 Refill(s) ; Ordering Provider:   Jazmin Vargas MD; Catalog Code:   ethinyl  estradiol-norethindrone ; Order Dt/Tm:   8/30/2018 6:03:20 PM          amphetamine-dextroamphetamine  :   amphetamine-dextroamphetamine ; Status:   Prescribed ; Ordered As Mnemonic:   Adderall XR 25 mg oral capsule, extended release ; Simple Display Line:   25 mg, 1 cap(s), PO, qAM, 30 cap(s), 0 Refill(s) ; Ordering Provider:   Jazmin Vargas MD; Catalog Code:   amphetamine-dextroamphetamine ; Order Dt/Tm:   6/22/2019 4:27:39 PM          amphetamine-dextroamphetamine  :   amphetamine-dextroamphetamine ; Status:   Prescribed ; Ordered As Mnemonic:   Adderall XR 25 mg oral capsule, extended release ; Simple Display Line:   25 mg, 1 cap(s), PO, qAM, 30 cap(s), 0 Refill(s) ; Ordering Provider:   Jazmin Vargas MD; Catalog Code:   amphetamine-dextroamphetamine ; Order Dt/Tm:   6/19/2019 4:27:39 PM          amphetamine-dextroamphetamine  :   amphetamine-dextroamphetamine ; Status:   Prescribed ; Ordered As Mnemonic:   Adderall XR 25 mg oral capsule, extended release ; Simple Display Line:   25 mg, 1 cap(s), PO, qAM, 30 cap(s), 0 Refill(s) ; Ordering Provider:   Jazmin Vargas MD; Catalog Code:   amphetamine-dextroamphetamine ; Order Dt/Tm:   5/15/2019 11:28:32 AM

## 2022-02-15 NOTE — NURSING NOTE
Depression Screening Entered On:  11/27/2019 9:46 AM CST    Performed On:  11/26/2019 9:46 AM CST by Henry Florian CMA               Depression Screening   Little Interest - Pleasure in Activities :   Several days   Feeling Down, Depressed, Hopeless :   Not at all   Initial Depression Screen Score :   1    Trouble Falling or Staying Asleep :   Nearly every day   Feeling Tired or Little Energy :   Nearly every day   Poor Appetite or Overeating :   Nearly every day   Feeling Bad About Yourself :   Not at all   Trouble Concentrating :   More than half the days   Moving or Speaking Slowly :   Not at all   Thoughts Better Off Dead or Hurting Self :   Not at all   Detailed Depression Screen Score :   11    Total Depression Screen Score :   12    AI Difficulty with Work, Home, Others :   Somewhat difficult   Henry Florian CMA - 11/27/2019 9:46 AM CST

## 2022-02-15 NOTE — NURSING NOTE
Comprehensive Intake Entered On:  9/11/2020 11:16 AM CDT    Performed On:  9/11/2020 11:16 AM CDT by Alice Hobbs CMA               Summary   Chief Complaint :   c/o sore throat, fatigue and shortness of breath present since Wednesday. Verbal consent given for video visit.    Menstrual Status :   Menarcheal   Height/Length Estimated :   64 in(Converted to: 5 ft 4 in, 162.56 cm)    Alice Hobbs CMA - 9/11/2020 11:16 AM CDT   Health Status   Allergies Verified? :   Yes   Medication History Verified? :   Yes   Pre-Visit Planning Status :   N/A   Tobacco Use? :   Never smoker   Alice Hobbs CMA - 9/11/2020 11:16 AM CDT   Consents   Consent for Immunization Exchange :   Consent Granted   Consent for Immunizations to Providers :   Consent Granted   Alice Hobbs CMA - 9/11/2020 11:16 AM CDT   Meds / Allergies   (As Of: 9/11/2020 11:16:52 AM CDT)   Allergies (Active)   Vyvanse  Estimated Onset Date:   Unspecified ; Reactions:   Irritability ; Created By:   Jazmin Vargas MD; Reaction Status:   Active ; Category:   Drug ; Substance:   Vyvanse ; Type:   Side Effect ; Severity:   Moderate ; Updated By:   Jazmin Vargas MD; Reviewed Date:   12/9/2019 11:18 AM CST        Medication List   (As Of: 9/11/2020 11:16:52 AM CDT)   Prescription/Discharge Order    ethinyl estradiol-norethindrone  :   ethinyl estradiol-norethindrone ; Status:   Prescribed ; Ordered As Mnemonic:   Loestrin 21 1/20 oral tablet ; Simple Display Line:   1 tab(s), PO, Daily, 84 tab(s), 3 Refill(s) ; Ordering Provider:   Jazmin Vargas MD; Catalog Code:   ethinyl estradiol-norethindrone ; Order Dt/Tm:   9/13/2019 12:20:42 PM CDT          methylphenidate  :   methylphenidate ; Status:   Prescribed ; Ordered As Mnemonic:   methylphenidate 10 mg oral tablet ; Simple Display Line:   15 mg, 1.5 tab(s), Oral, bid, 90 tab(s), 0 Refill(s) ; Ordering Provider:   Jazmin Vargas MD; Catalog Code:   methylphenidate ; Order Dt/Tm:   3/24/2020 5:30:34 PM  CDT            ID Risk Screen   Recent Travel History :   No recent travel   Family Member Travel History :   No recent travel   Other Exposure to Infectious Disease :   Unknown   Alice Hobbs CMA - 9/11/2020 11:16 AM CDT

## 2022-02-15 NOTE — TELEPHONE ENCOUNTER
---------------------  From: Yashira Gray   Sent: 11/17/2021 4:40:32 PM CST  Subject: General Message     Pt notified negative covid results.

## 2022-02-15 NOTE — TELEPHONE ENCOUNTER
---------------------  From: Jazmin Vargas MD   To: SENG SCHRADER    Sent: 2/12/2021 2:06:24 PM CST  Subject: General Message     This is a copy for you, looks good, hope you are feeling better!      Results:  Date Result Name Ind Value Ref Range   2/11/2021 10:45 AM U pH  5.7 (4.5 - 9.0)   2/11/2021 10:45 AM U Creatinine  230.5 mg/dL (> or = 20.0 - )   2/11/2021 10:45 AM Pain Management Prescribed Drug 1  Vyvanse(TM)    2/11/2021 10:45 AM U Alcohol Metab medMATCH  CONSISTENT    2/11/2021 10:45 AM U Alcohol Metabolities  NEGATIVE ng/mL ( - <500)   2/11/2021 10:45 AM U Amphetamines Screen  NEGATIVE ng/mL ( - <500)   2/11/2021 10:45 AM U Amphetamines medMATCH ((A)) INCONSISTENT    2/11/2021 10:45 AM U Benzodia Scrn  NEGATIVE ng/mL ( - <100)   2/11/2021 10:45 AM U Benzodia medMATCH  CONSISTENT    2/11/2021 10:45 AM U Buprenorphine  NEGATIVE ng/mL ( - <5)   2/11/2021 10:45 AM U Cannabis Metabolite medMATCH  CONSISTENT    2/11/2021 10:45 AM U Cannabis Metabolite Scrn  NEGATIVE ng/mL ( - <20)   2/11/2021 10:45 AM U Cocaine Metabolite Scrn  NEGATIVE ng/mL ( - <150)   2/11/2021 10:45 AM U Cocaine Metabolite medMATCH  CONSISTENT    2/11/2021 10:45 AM U MDMA  NEGATIVE ng/mL ( - <500)   2/11/2021 10:45 AM U medMATCH Comments  See comment    2/11/2021 10:45 AM U 6-Acetylmorphine  NEGATIVE ng/mL ( - <10)   2/11/2021 10:45 AM U 6 Acetylmorphine medMATCH  CONSISTENT    2/11/2021 10:45 AM U Opiates Scrn  NEGATIVE ng/mL ( - <100)   2/11/2021 10:45 AM U Opiates medMATCH  CONSISTENT    2/11/2021 10:45 AM U Oxidants  NEGATIVE mcg/mL ( - <200)   2/11/2021 10:45 AM U Oxycodone Scrn  NEGATIVE ng/mL ( - <100)   2/11/2021 10:45 AM U Oxycodone Scrn medMATCH  CONSISTENT    2/11/2021 10:45 AM U Buprenorphine medMATCH  CONSISTENT    2/11/2021 10:45 AM U MDMA medMATCH  CONSISTENT

## 2022-02-15 NOTE — PROGRESS NOTES
Chief Complaint    needs medication refill - feels like Vyvanse is not working, would like to try something different  History of Present Illness       Patient has ADHD.  We have tried methylphenidate immediate release and methylphenidate extended release and have tried changing the dose at least once for each of these.  She has also tried Vyvanse for 2 months changing the dose in between.  These both cause increased irritability.  She difficult for her to tell how well they help with her concentration because her irritability and anxiety worsen so much with these medications that she has difficulty concentrating due to the side effects also.  She is also been having an increase in PTSD symptoms with some flashbacks while on the Vyvanse.  Usually PTSD symptoms are well controlled and usually only has poorly controlled symptoms about twice a year.  When they do occur she is usually able to manage the symptoms with her tools.  This has been significantly worse since being on the Vyvanse.       She is very nauseous on arrival to clinic.  During our appointment she becomes more more ashen and explains that she took her medications this morning on an empty stomach including some multivitamins and tries to eat a few saltine crackers and to take some sips of water to try to help with the nausea however during her appointment she abruptly has to stand and go to the garbage can and vomits.  Following this she immediately starts to feel better.  Color normalizes she quits looking diaphoretic.       She restarted taking college classes this semester and had to withdraw from 2 of her classes because her ADHD was not well controlled.  Physical Exam   Vitals & Measurements    T: 98.3  F (Tympanic)  HR: 97 (Peripheral)  BP: 122/76  SpO2: 98%     HT: 64 in  HT: 64 in  WT: 152.6 lb  BMI: 26.19        General on arrival patient is pale and mildly ill-appearing, she became more pale and diaphoretic until finally vomiting and then  "coloring much improved and she \"looked and felt much better  Assessment/Plan       ADD (Attention Deficit Disorder) (F98.8)                Orders:         amphetamine-dextroamphetamine, = 1 cap(s) ( 25 mg ), PO, qAM, Instructions: fill on or after 02/06/2021, # 30 cap(s), 0 Refill(s), Type: Maintenance, Pharmacy: [id number], 1 cap(s) Oral qam,Instr:fill on or after 02/06/2021, 64, in, 01/08/21 9:05:00 CST, Height Bhavana..., (Completed)         amphetamine-dextroamphetamine, = 1 cap(s) ( 25 mg ), Oral, qam, Instructions: may fill in 28 days, # 30 cap(s), 0 Refill(s), Type: Maintenance, Pharmacy: [facility] #[id number], 1 cap(s) Oral qam,Instr:may fill in 28 days, 64, in, 01/08/21 9:05:00 CST, Height Measured, 157.6,..., (Completed)         amphetamine-dextroamphetamine, = 1 cap(s) ( 25 mg ), PO, qAM, # 30 cap(s), 0 Refill(s), Type: Maintenance, Pharmacy: [facility] #[id number], 1 cap(s) Oral qam, 64, in, 01/08/21 9:05:00 CST, Height Measured, 157.6, lb, 01/08/21 9:05:00 CST, Weight Measured, (Completed)         amphetamine-dextroamphetamine, = 1 cap(s) ( 25 mg ), Oral, qam, # 30 cap(s), 0 Refill(s), Type: Maintenance, Pharmacy: [facility] #[id number], 1 cap(s) Oral qam, 64, in, 01/08/21 9:05:00 CST, Height Measured, 157.6, lb, 01/08/21 9:05:00 CST, Weight Measured, (Completed)         methylphenidate, = 1.5 tab(s) ( 15 mg ), Oral, bid, # 90 tab(s), 0 Refill(s), Type: Maintenance, Pharmacy: [facility] [place], 1.5 tab(s) Oral bid, 64, in, 12/09/19 11:15:00 CST, Height Measured, 163.4, lb, 04/29/20 11:29:00 CDT, Weight Measured, (Completed)       We will try again to get her Adderall prior authorization done.  She had previously been well controlled on 25 mg of the extended release daily.  We will plan to follow-up in 4 months but sooner if needed.  If she finds that the medication wears off too early we can try adding an immediate release for use in the afternoon.  Nausea and " vomiting secondary to taking her medication on empty stomach encouraged patient to       Make sure that she takes her multivitamins with food.  She did not take her Vyvanse today because it has been having too many adverse side effects.  Not sure if she will be able to tolerate taking Adderall on an empty stomach.        She should return to clinic if symptoms worsen or do not improve.  Total time spent reviewing chart and preparing for appointment, with patient for appointment, and time spent charting and coordinating care on the day of the appointment in minutes was: 77  Patient Information     Name:SENG SCHRADER      Address:      [id number] [place] [id number]     Sex:Female     YOB: 2000     [phone]     Emergency Contact:RAGHU SCHRADER     MRN:[id number]     FIN:[id number]     Location:[facility]     Date of Service:02/11/2021      Primary Care Physician:       Jazmin Vargas MD, [phone]      Attending Physician:       Jazmin Vargas MD, [phone]  Problem List/Past Medical History    Ongoing     ADHD (attention deficit hyperactivity disorder)     ADHD (attention deficit hyperactivity disorder), inattentive type     Concussion     History of COVID-19     Needle phobia     Syncope     Uses oral contraception    Historical     Viral pharyngitis  Procedure/Surgical History     No previous procedures        Medications    Adderall XR 25 mg oral capsule, extended release, 25 mg= 1 cap(s), Oral, qam    Adderall XR 25 mg oral capsule, extended release, 25 mg= 1 cap(s), Oral, qam    ethinyl estradiol-norethindrone 20 mcg-1 mg oral tablet, 1 tab(s), Oral, daily    Vyvanse 20 mg oral capsule, 20 mg= 1 cap(s), Oral, qam  Allergies    Vyvanse (Irritability)    Concerta (Irritability, Anxiety)    methylphenidate (Irritability, Anxiety)  Social History    Smoking Status     Never smoker     Alcohol      Never     Electronic Cigarette/Vaping       Electronic Cigarette Use: Never.     Home/Environment      Lives with Father, Stepmother. Substance abuse in household: No. Smoker in household: No. Risks in environment: Does not wear helmet.     Sexual      Sexually active: No.     Substance Abuse      Never     Tobacco      Never (less than 100 in lifetime)  Family History    Gastro-esophageal reflux disease: Mother.    Suicide..: Mother.  Immunizations      Vaccine Date Status          meningococcal conjugate vaccine 11/16/2018 Given          Hep A, pediatric/adolescent 11/16/2018 Given          influenza virus vaccine, inactivated 11/09/2018 Given          human papillomavirus vaccine 06/03/2014 Given          human papillomavirus vaccine 01/17/2014 Given          tetanus/diphth/pertuss (Tdap) adult/adol 01/17/2014 Recorded          DTaP-IPV 01/17/2014 Recorded          influenza (LAIV) 11/11/2013 Given          human papillomavirus vaccine 11/11/2013 Given          meningococcal conjugate vaccine 07/19/2012 Given          tetanus/diphth/pertuss (Tdap) adult/adol 07/19/2012 Given          influenza 09/22/2011 Given          influenza virus vaccine, inactivated 10/26/2010 Given          influenza, H1N1, live 01/18/2010 Recorded          influenza, H1N1, live 12/15/2009 Recorded          varicella 06/01/2009 Recorded          MMR (measles/mumps/rubella) 07/20/2006 Recorded          IPV 07/20/2006 Recorded          DTaP 07/20/2006 Recorded          Hep B-Hib 10/03/2002 Recorded          DTaP 10/03/2002 Recorded          varicella 07/11/2002 Recorded          MMR (measles/mumps/rubella) 07/11/2002 Recorded          pneumococcal (PCV7) 03/28/2002 Recorded          pneumococcal (PCV7) 01/03/2002 Recorded          IPV 01/03/2002 Recorded          DTaP 01/03/2002 Recorded          Hep B-Hib 2001 Recorded          IPV 2001 Recorded          DTaP 2001 Recorded          pneumococcal (PCV7) 2001 Recorded          Hep B-Hib 2001 Recorded           IPV 2001 Recorded          DTaP 2001 Recorded  Lab Results       Lab Results (Last 4 results within 90 days)        Coronavirus SARS-CoV-2 (COVID-19) TR: Positive (12/15/20 15:40:00)       Chlam/N. gonorrhea Comments: See comment (01/08/21 10:12:00)       Group A Strep POC: NOT DETECTED (01/02/21 11:18:00)       Chlamydia RNA: NOT DETECTED (01/08/21 10:12:00)       Neisseria gonorrhoeae RNA: NOT DETECTED (01/08/21 10:12:00)       Culture Strep A: See comment (01/02/21 11:27:00)       Wet Prep Yeast: None Seen (01/08/21 10:13:00)       Wet Prep Trichomonas: None Seen (01/08/21 10:13:00)       Wet Prep Clue Cells: None Seen (01/08/21 10:13:00)

## 2022-02-15 NOTE — NURSING NOTE
Comprehensive Intake Entered On:  12/9/2019 11:22 AM CST    Performed On:  12/9/2019 11:15 AM CST by Henry Florian CMA               Summary   Chief Complaint :   Pt here for sore throat and sinus pressure x 3-4 days.   Menstrual Status :   Menarcheal   Weight Measured :   152 lb(Converted to: 152 lb 0 oz, 68.95 kg)    Height Measured :   64 in(Converted to: 5 ft 4 in, 162.56 cm)    Body Mass Index :   26.09 kg/m2   Body Surface Area :   1.76 m2   Systolic Blood Pressure :   106 mmHg   Diastolic Blood Pressure :   76 mmHg   Mean Arterial Pressure :   86 mmHg   Peripheral Pulse Rate :   88 bpm   BP Site :   Right arm   Pulse Site :   Radial artery   Temperature Tympanic :   97.8 DegF(Converted to: 36.6 DegC)  (LOW)    Respiratory Rate :   16 br/min   Oxygen Saturation :   99 %   Henry Florian CMA - 12/9/2019 11:15 AM CST   Health Status   Allergies Verified? :   Yes   Medication History Verified? :   Yes   Medical History Verified? :   Yes   Pre-Visit Planning Status :   Not completed   Tobacco Use? :   Never smoker   Henry Florian CMA - 12/9/2019 11:15 AM CST   Meds / Allergies   (As Of: 12/9/2019 11:22:02 AM CST)   Allergies (Active)   Vyvanse  Estimated Onset Date:   Unspecified ; Reactions:   Irritability ; Created By:   Jazmin Vargas MD; Reaction Status:   Active ; Category:   Drug ; Substance:   Vyvanse ; Type:   Side Effect ; Severity:   Moderate ; Updated By:   Jazmin Vargas MD; Reviewed Date:   12/9/2019 11:18 AM CST        Medication List   (As Of: 12/9/2019 11:22:02 AM CST)   Prescription/Discharge Order    Vyvanse 30 mg oral capsule  :   Vyvanse 30 mg oral capsule ; Status:   Processing ; Ordered As Mnemonic:   Vyvanse 30 mg oral capsule ; Ordering Provider:   Jazmin Vargas MD; Action Display:   Complete ; Catalog Code:   lisdexamfetamine ; Order Dt/Tm:   12/9/2019 11:18:30 AM CST          Vyvanse 30 mg oral capsule  :   Vyvanse 30 mg oral capsule ; Status:   Prescribed ; Ordered As  Mnemonic:   Vyvanse 30 mg oral capsule ; Simple Display Line:   1 cap(s), Oral, qam, 30 unknown unit ; Ordering Provider:   Jazmin Vargas MD; Catalog Code:   lisdexamfetamine ; Order Dt/Tm:   10/22/2019 6:14:33 PM CDT          methylphenidate  :   methylphenidate ; Status:   Prescribed ; Ordered As Mnemonic:   Concerta 18 mg/24 hr oral tablet, extended release ; Simple Display Line:   18 mg, 1 tab(s), Oral, qam, 30 tab(s), 0 Refill(s) ; Ordering Provider:   Jazmin Vargas MD; Catalog Code:   methylphenidate ; Order Dt/Tm:   11/26/2019 1:09:06 PM CST          ethinyl estradiol-norethindrone  :   ethinyl estradiol-norethindrone ; Status:   Prescribed ; Ordered As Mnemonic:   Loestrin 21 1/20 oral tablet ; Simple Display Line:   1 tab(s), PO, Daily, 84 tab(s), 3 Refill(s) ; Ordering Provider:   Jazmin Vargas MD; Catalog Code:   ethinyl estradiol-norethindrone ; Order Dt/Tm:   9/13/2019 12:20:42 PM CDT            Social History   Social History   (As Of: 12/9/2019 11:22:02 AM CST)   Alcohol:        Never   (Last Updated: 9/5/2018 8:33:10 AM CDT by Nadiya Sinclair)          Tobacco:        Never, Household tobacco concerns: No.   (Last Updated: 5/13/2011 11:48:42 AM CDT by Lorena Hood)          Substance Abuse:        Never   (Last Updated: 8/11/2015 2:20:36 PM CDT by Henry Florian CMA)          Home/Environment:        Lives with Father, Stepmother.  Substance abuse in household: No.  Smoker in household: No.  Risks in environment: Does not wear helmet.   (Last Updated: 8/11/2015 2:25:12 PM CDT by Henry Florian CMA)          Sexual:        Sexually active: No.   (Last Updated: 8/11/2015 2:21:05 PM CDT by Henry Florian CMA)

## 2022-02-15 NOTE — TELEPHONE ENCOUNTER
---------------------  From: Jazmin Vargas MD   To: [facility] Message Pool ([id number] [facility] [place]);     Sent: 1/8/2021 11:47:21 AM CST  Subject: General Message     pls let pt know I received a fax letting me know that adderall does require a PA, we will do the PA and find out if she has tried enough alternatives to get the Adderall,, if not then we will try dexmethylphenidate and see how that goes....Spoke with pt and she is aware that PA is being done for Adderall, she was also notified of negative wetprep and will wait for GC/Chlamydia results for next step.PA completed and faxed to pharmacy, pending approval/denial.

## 2022-02-15 NOTE — PROGRESS NOTES
Chief Complaint    f/u concussion. Has not been taking birth control or adderall. Read online that these cause heart issues. Would like to discuss.  History of Present Illness      patient present to clinic today for follow up  scat 2 now 0      difficulty concentrating since stopping her adhd meds, would like to resume, has been [facility] to [facility], not sure what she wants to do about contraception, getting a lot of headaches.      Review of systems is negative except as per HPI including:  no fevers, chills, sore throat, runny nose, nausea, vomiting, constipation, diarrhea, rash or new skin lesions, chest pain, palpitations, slurred speech, new paresthesia, shortness of breath or wheeze.      Exam:      General: alert and oriented ×3 no acute distress.      HEENT: pupils are equal round and reactive to light extraocular motion is intact. Normocephalic and atraumatic.       Hearing is grossly normal and there is no otorrhea.       Nares are patent there is no rhinorrhea.       Mucous membranes are moist and pink.      Chest: has bilateral rise with no increased work of breathing.      Cardiovascular: normal perfusion and brisk capillary refill.      Musculoskeletal: no gross focal abnormalities and normal gait.  ttp over occipital head, hypertonicity in bilateral traps      Neuro: no gross focal abnormalities and memory seems intact.      Psychiatric: speech is clear and coherent and fluent. Patient dressed appropriately for the weather. Mood is appropriate and affect is full.                     Discussed with patient to return to clinic if symptoms worsen or do not improve  Physical Exam   Vitals & Measurements    T: 97.7   F (Tympanic)  HR: 76(Peripheral)  BP: 118/78  SpO2: 99%     WT: 145.6 lb   Assessment/Plan       ADD (attention deficit disorder) (F98.8)         Ordered:          Return to Clinic (Request), Return in 4 weeks                Concussion (S06.0X9A)         Ordered:          Physical  Therapy Evaluation and Treatment (Request), Headache, tension-type  Concussion  Low back pain  Neck pain                Headache, tension-type (G44.209)         Ordered:          Physical Therapy Evaluation and Treatment (Request), Headache, tension-type  Concussion  Low back pain  Neck pain                Low back pain (M54.5)         Ordered:          Physical Therapy Evaluation and Treatment (Request), Headache, tension-type  Concussion  Low back pain  Neck pain                Neck pain (M54.2)         Ordered:          Physical Therapy Evaluation and Treatment (Request), Headache, tension-type  Concussion  Low back pain  Neck pain                Orders:         amphetamine-dextroamphetamine, = 1 cap(s) ( 20 mg ), Oral, qam, # 30 cap(s), 0 Refill(s), Type: Soft Stop, Pharmacy: [id number], 1 cap(s) Oral qam, (Ordered)      15 minutes spent with patient in direct face to face contact, > 50% of time spent counseling and coordinating care.   Patient Information     Name:SENG SCHRADER      Address:      [address] [address]     Sex:Female     YOB: 2000     [phone]     Emergency Contact:RAGHU SCHRADER     MRN:[id number]     FIN:[id number]     Location:[facility]     Date of Service:12/14/2018      Primary Care Physician:       Jazmin Vargas MD, [phone]      Attending Physician:       Jazmin Vargas MD, [phone]  Problem List/Past Medical History    Ongoing     ADD (attention deficit disorder)       Comments: 'Possible'     Concussion     Needle phobia     Syncope    Historical     Viral pharyngitis  Procedure/Surgical History     No previous procedures        Medications     Loestrin 21 1/20 oral tablet: 1 tab(s), PO, Daily, 84 tab(s), 3 Refill(s).     Adderall XR 20 mg oral capsule, extended release: 20 mg, 1 cap(s), PO, qAM, 30 cap(s), 0 Refill(s).     amphetamine-dextroamphetamine 20 mg oral capsule,  extended release: See Instructions, Take 1 capsule by mouth every morning, 30 cap(s), 0 Refill(s).     amphetamine-dextroamphetamine 20 mg oral capsule, extended release: See Instructions, Take 1 capsule by mouth every morning, do not fill until 12/7/2018, 30 cap(s), 0 Refill(s).     amphetamine-dextroamphetamine 20 mg oral capsule, extended release: 20 mg, 1 cap(s), Oral, qam, 30 cap(s), 0 Refill(s).          Allergies    No Known Medication Allergies  Social History    Smoking Status - 12/14/2018     Never smoker     Alcohol      Never, 09/05/2018     Home and Environment      Lives with Father, Stepmother. Substance abuse in household: No. Smoker in household: No. Risks in environment: Does not wear helmet., 08/11/2015     Sexual      Sexually active: No., 08/11/2015     Substance Abuse      Never, 08/11/2015     Tobacco      Never, Household tobacco concerns: No., 05/13/2011  Family History    Gastro-esophageal reflux disease: Mother.    Suicide..: Mother.  Immunizations      Vaccine Date Status      meningococcal conjugate vaccine 11/16/2018 Given      Hep A, pediatric/adolescent 11/16/2018 Given      influenza virus vaccine, inactivated 11/09/2018 Given      human papillomavirus vaccine 06/03/2014 Given      human papillomavirus vaccine 01/17/2014 Given      DTaP-IPV 01/17/2014 Recorded      tetanus/diphth/pertuss (Tdap) adult/adol 01/17/2014 Recorded      influenza (LAIV) 11/11/2013 Given      human papillomavirus vaccine 11/11/2013 Given      meningococcal conjugate vaccine 07/19/2012 Given      tetanus/diphth/pertuss (Tdap) adult/adol 07/19/2012 Given      influenza 09/22/2011 Given      influenza virus vaccine, inactivated 10/26/2010 Given      influenza, H1N1, live 01/18/2010 Recorded      influenza, H1N1, live 12/15/2009 Recorded      varicella 06/01/2009 Recorded      DTaP 07/20/2006 Recorded      MMR (measles/mumps/rubella) 07/20/2006 Recorded      IPV 07/20/2006 Recorded      DTaP 10/03/2002  Recorded      Hep B-Hib 10/03/2002 Recorded      varicella 07/11/2002 Recorded      MMR (measles/mumps/rubella) 07/11/2002 Recorded      pneumococcal (PCV7) 03/28/2002 Recorded      DTaP 01/03/2002 Recorded      pneumococcal (PCV7) 01/03/2002 Recorded      IPV 01/03/2002 Recorded      DTaP 2001 Recorded      Hep B-Hib 2001 Recorded      IPV 2001 Recorded      DTaP 2001 Recorded      pneumococcal (PCV7) 2001 Recorded      Hep B-Hib 2001 Recorded      IPV 2001 Recorded  Lab Results       Lab Results (Last 4 results within 90 days)        Sodium Level: 140 mmol/L [135 mmol/L - 146 mmol/L] (11/16/18 15:53:00)       Potassium Level: 4.5 mmol/L [3.8 mmol/L - 5.1 mmol/L] (11/16/18 15:53:00)       Chloride Level: 107 mmol/L [98 mmol/L - 110 mmol/L] (11/16/18 15:53:00)       CO2 Level: 27 mmol/L [20 mmol/L - 32 mmol/L] (11/16/18 15:53:00)       Glucose Level: 79 mg/dL [65 mg/dL - 99 mg/dL] (11/16/18 15:53:00)       BUN: 10 mg/dL [7 mg/dL - 20 mg/dL] (11/16/18 15:53:00)       Creatinine Level: 0.62 mg/dL [0.5 mg/dL - 1 mg/dL] (11/16/18 15:53:00)       BUN/Creat Ratio: NOT APPLICABLE [6  - 22] (11/16/18 15:53:00)       Calcium Level: 9.6 mg/dL [8.9 mg/dL - 10.4 mg/dL] (11/16/18 15:53:00)       Phosphorus Level: 4.4 mg/dL [2.5 mg/dL - 4.5 mg/dL] (11/16/18 15:53:00)       Magnesium Level: 2.1 mg/dL [1.5 mg/dL - 2.5 mg/dL] (11/16/18 15:53:00)       TSH: 0.98 mIU/L [0.5 mg/dL - 1 mg/dL] (11/16/18 15:53:00)       WBC: 4.9 [4.5  - 13] (11/16/18 15:53:00)       RBC: 4.59 [3.8  - 5.1] (11/16/18 15:53:00)       Hgb: 13.6 gm/dL [11.5 gm/dL - 15.3 gm/dL] (11/16/18 15:53:00)       Hct: 39.7 % [34 % - 46 %] (11/16/18 15:53:00)       MCV: 86.5 fL [78 fL - 98 fL] (11/16/18 15:53:00)       MCH: 29.6 pg [25 pg - 35 pg] (11/16/18 15:53:00)       MCHC: 34.3 gm/dL [31 gm/dL - 36 gm/dL] (11/16/18 15:53:00)       RDW: 13.1 % [11 % - 15 %] (11/16/18 15:53:00)       Platelet: 260 [140  - 400] (11/16/18  15:53:00)       MPV: 10.2 fL [7.5 fL - 12.5 fL] (11/16/18 15:53:00)       Lymphocytes: 45.3 % [2.5 mg/dL - 4.5 mg/dL] (11/16/18 15:53:00)       Abs Lymphocytes: 2220 [1200  - 5200] (11/16/18 15:53:00)       Neutrophils: 42.8 % [0  - 200] (11/16/18 15:53:00)       Abs Neutrophils: 2097 [1800  - 8000] (11/16/18 15:53:00)       Monocytes: 8.2 % [2.5 mg/dL - 4.5 mg/dL] (11/16/18 15:53:00)       Abs Monocytes: 402 [200  - 900] (11/16/18 15:53:00)       Eosinophils: 3.1 % [2.5 mg/dL - 4.5 mg/dL] (11/16/18 15:53:00)       Abs Eosinophils: 152 [15  - 500] (11/16/18 15:53:00)       Basophils: 0.6 % [2.5 mg/dL - 4.5 mg/dL] (11/16/18 15:53:00)       Abs Basophils: 29 [0  - 200] (11/16/18 15:53:00)

## 2022-02-15 NOTE — PROGRESS NOTES
Patient:   SENG SCHRADER            MRN: [id number]            FIN: [id number]               Age:   20 years     Sex:  Female     :  2000   Associated Diagnoses:   Suspected COVID-19 virus infection   Author:   Steven De Oliveira MD      Impression and Plan   Diagnosis     Suspected COVID-19 virus infection (GQC41-LN R68.89).     Course:  Worsening.    Plan:    1. COVID testing  2. Self Quarantine until test negative  3. Symptomatic treatments including OTC antipyretics and cough suppressants, rest and fluids.  4. Go to ER for severe symptoms  5. [facility] will contact you if the test is positive..    Summary:  MDM:  1. Medical Record Reviewed prior to the encounter to establish possible comorbidities and risk stratification  2. Discussed current symptoms and general medical condition  3. Discussed plan for further evaluation and treatment  4. Post encounter reviewed test results and notified patient of the results  5. Post encounter revised treatment plan based on updated condition and test results   .    Orders     Orders   Charges (Evaluation and Management):  17113 office outpatient visit 15 minutes (Charge) (Order): Quantity: 1, Suspected COVID-19 virus infection.     Orders (Selected)   Outpatient Orders  Ordered  SARS-CoV-2 RNA (COVID-19), Qualitative NAAT (Request): Suspected COVID-19 virus infection.        Visit Information      Date of Service: 12/15/2020 03:20 pm  Performing Location: [facility]  Encounter#: [id number]      Primary Care Provider (PCP):  Jazmin Vargas MD    NPI# [id number]      Referring Provider:  Steven De Oliveira MD    NPI# [id number]   Visit type:  Video Visit via Doximity.    Participants in room during visit:  _Patient only   Accompanied by:  No one.    Source of history:  Self.    Location of patient:  _home  Location of provider:  _ Clinic  Video Start Time:  _1520  Video End Time:   _1533    Today's visit was conducted via video conference due to  the COVID-19 pandemic.  The patient's consent to proceed with a video visit has been obtained and documented.   Referral source:  Self.    History limitation:  None.       Chief Complaint   12/15/2020 3:17 PM CST   consent for video visit for COVID test        History of Present Illness   Patient is a _20 year old F_ who is being evaluated via a billable video visit.    Ethnicity:   Onset of symptoms: 12/11/2020  COVID symptoms:  Fever  Chills  Cough  Dyspnea  Rhinorrhea  Headache  Fatigue  Loss of taste or smell  nausea      Review of Systems   ROS negative other than the symptoms described above      Health Status   Allergies:    Nonallergic Reactions (Selected)  Moderate  Vyvanse (Irritability)   Medications:  (Selected)   Prescriptions  Prescribed  ethinyl estradiol-norethindrone 20 mcg-1 mg oral tablet: 1 tab(s), Oral, daily, # 84 tab(s), 0 Refill(s), Type: Maintenance, Pharmacy: [facility], 1 tab(s) Oral daily, 64, in, 12/09/19 11:15:00 CST, Height Measured, 163.4, lb, 04/29/20 11:29:00 CDT, Weight Measured  methylphenidate 10 mg oral tablet: = 1.5 tab(s) ( 15 mg ), Oral, bid, # 90 tab(s), 0 Refill(s), Type: Maintenance, Pharmacy: [facility], 1.5 tab(s) Oral bid  methylphenidate 10 mg oral tablet: = 1.5 tab(s) ( 15 mg ), Oral, bid, # 90 tab(s), 0 Refill(s), Type: Maintenance, Pharmacy: [facility], 1.5 tab(s) Oral bid, 64, in, 12/09/19 11:15:00 CST, Height Measured, 163.4, lb, 04/29/20 11:29:00 CDT, Weight Measured,    Medications          *denotes recorded medication          ethinyl estradiol-norethindrone 20 mcg-1 mg oral tablet: 1 tab(s), Oral, daily, 84 tab(s), 0 Refill(s).          methylphenidate 10 mg oral tablet: 15 mg, 1.5 tab(s), Oral, bid, 90 tab(s), 0 Refill(s).          methylphenidate 10 mg oral tablet: 15 mg, 1.5 tab(s), Oral, bid, 90 tab(s), 0 Refill(s).       Problem list:    All Problems  ADD (attention deficit disorder) /  SNOMED CT 0151075466 / Confirmed  ADHD (attention deficit hyperactivity disorder) / SNOMED CT 93164899 / Confirmed  ADHD (attention deficit hyperactivity disorder), inattentive type / SNOMED CT 43423901 / Confirmed  Concussion / SNOMED CT 8038275700 / Confirmed  Needle phobia / SNOMED CT 594399816 / Confirmed  Syncope / SNOMED CT 670429171 / Confirmed  Uses oral contraception / SNOMED CT 04288238 / Confirmed      Histories   Past Medical History:    Active  ADD (attention deficit disorder) (0880619603)  Comments:  2011 CDT 11:51 AM CDT - Lorena Hood  'Possible'  Resolved  Viral pharyngitis (6431586):  Resolved.   Family History:    Gastro-esophageal reflux disease  Mother ()  Suicide..  Mother ()     Procedure history:    No previous procedures.   Social History:        Electronic Cigarette/Vaping Assessment            Electronic Cigarette Use: Never.      Alcohol Assessment            Never      Tobacco Assessment            Never, Household tobacco concerns: No.            Never (less than 100 in lifetime)      Substance Abuse Assessment            Never      Home and Environment Assessment            Lives with Father, Stepmother.  Substance abuse in household: No.  Smoker in household: No.  Risks in               environment: Does not wear helmet.      Sexual Assessment            Sexually active: No.        Physical Examination   General:  Alert and oriented, No acute distress.    Eye:  Pupils are equal, round and reactive to light, Extraocular movements are intact, Normal conjunctiva.    Respiratory:  Respirations are non-labored.    Integumentary:  Warm, Dry, Pink.    Neurologic:  Normal motor function.    Psychiatric:  Cooperative, Appropriate mood & affect, Normal judgment, Non-suicidal.         Mood and affect: Calm.         Behavior: Relaxed.         Judgment: Able to make sensible decisions, Appropriate in social situations.         Thought process: Appropriate.       Health  Maintenance      Recommendations     Pending (in the next year)        OverDue           Influenza Vaccine due  08/31/20  and every 1  year(s)        Due            Alcohol Misuse Screen (Female) due  11/26/20  and every 1  year(s)           Depression Screen (Female) due  11/26/20  and every 1  year(s)           Body Mass Index Check (Female) due  12/09/20  and every 1  year(s)           Chlamydia Screen (if sexually active) due  12/15/20  and every 1  year(s)           Gonorrhea Screen (if sexually active) due  12/15/20  and every 1  year(s)           HIV Screen (if sexually active) (Female) due  12/15/20  and every 1  year(s)           Intimate Partner Violence Screening due  12/15/20  and every 1  year(s)           STD Counseling (if sexually active) (Female) due  12/15/20  and every 1  year(s)           Syphilis Screen (if sexually active) (Female) due  12/15/20  and every 1  year(s)           Tetanus Vaccine due  12/15/20  and every 10  year(s)        Due In Future            High Blood Pressure Screen (Female) not due until  04/29/21  and every 1  year(s)     Satisfied (in the past 1 year)        Satisfied            High Blood Pressure Screen (Female) on  04/29/20.           High Blood Pressure Screen (Female) on  02/13/20.           High Blood Pressure Screen (Female) on  01/07/20.           Tobacco Use Screen (Female) on  12/15/20.           Tobacco Use Screen (Female) on  09/11/20.           Tobacco Use Screen (Female) on  04/29/20.           Tobacco Use Screen (Female) on  02/13/20.           Tobacco Use Screen (Female) on  01/07/20.

## 2022-02-15 NOTE — TELEPHONE ENCOUNTER
---------------------  From: Falguni Pereira   To: Alicia TAMEZ, [facility];     Sent: 3/20/2019 2:15:16 PM CDT  Subject: General Message     For your information, we have no record that your patient completed the CARDIO CONSULT ordered again on 01/29/2019.  [facility] not in their [facility].  Falguni Pereira

## 2022-02-15 NOTE — LETTER
(Inserted Image. Unable to display)     May 20, 2019      SENG MACRINA  [id number] [place], [place] [id number]          Dear SENG,      Thank you for selecting [facility] ([facility]viously [facility].      Our records indicate you are due for the following services:     Immunizations: Hep A #2.      To schedule an appointment or if you have further questions, please contact your primary clinic:   [facility]       [phone]   [facility]       [phone]              [facility]     [phone]      Powered by [employer] and [employer]    Sincerely,    Jazmin Vargas MD

## 2022-02-15 NOTE — PROGRESS NOTES
Patient:   SENG SCHRADER            MRN: [id number]            FIN: [id number]               Age:   17 years     Sex:  Female     :  2000   Associated Diagnoses:   Routine sports physical exam   Author:   Steven Katz PA-C      Results Review   General results   Today's results   2017 12:05 PM CDT Height Measured - Standard 63.5 in    Weight Measured - Standard 167 lb    BSA 1.84 m2    Body Mass Index 29.12 kg/m2    Body Mass Index Percentile 95.17    Temperature Tympanic 97.7 DegF  LOW    Peripheral Pulse Rate 64 bpm    Pulse Site Radial artery    Respiratory Rate 16 br/min    Systolic Blood Pressure 116 mmHg    Diastolic Blood Pressure 70 mmHg    Mean Arterial Pressure 85 mmHg    BP Site Right arm    Allergies Verified? Yes    Medication History Verified? Yes    Medical History Verified? Yes    Tobacco Use? Never smoker    Pre-Visit Planning Status Not completed    Corrective Lenses None    Eye, Right Visual Acuity 20/20    Eye, Left Visual Acuity 20/25    Eye, Bilateral Visual Acuity 20/20    Chief Complaint Pt here for Sports Px Tennis at [facility]    Comprehensive Intake Form Comprehensive Intake Form    Intake Form Comprehensive Intake         Health Status   Allergies:    Allergic Reactions (Selected)  No known allergies   Medications:  (Selected)   , not on any regular medications   Problem list:    All Problems  ADD (Attention Deficit Disorder) / ICD-9-.00 / Confirmed  Viral pharyngitis / ICD-9- / Confirmed      Subjective   Here for sports physical for tennis  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  Mother ()     Procedure history:    No  active procedure history items have been selected or recorded.   Social History:        Tobacco Assessment            Never, Household tobacco concerns: No.      Substance Abuse Assessment            Never      Home and Environment Assessment            Lives with Father, Stepmother.  Substance abuse in household: No.  Smoker in household: No.  Risks in               environment: Does not wear helmet.      Sexual Assessment            Sexually active: No.        Objective   Vital Signs   8/7/2017 12:05 PM CDT Temperature Tympanic 97.7 DegF  LOW    Peripheral Pulse Rate 64 bpm    Pulse Site Radial artery    Respiratory Rate 16 br/min    Systolic Blood Pressure 116 mmHg    Diastolic Blood Pressure 70 mmHg    Mean Arterial Pressure 85 mmHg    BP Site Right arm      Measurements from flowsheet : Measurements   8/7/2017 12:05 PM CDT Height Measured - Standard 63.5 in    Weight Measured - Standard 167 lb    BSA 1.84 m2    Body Mass Index 29.12 kg/m2    Body Mass Index Percentile 95.17      General:  No acute distress.    Eye:  Pupils are equal, round and reactive to light, Extraocular movements are intact.    HENT:  Tympanic membranes are clear, No pharyngeal erythema, No sinus tenderness.    Neck:  Supple, Non-tender, No lymphadenopathy.    Respiratory:  Lungs are clear to auscultation.    Cardiovascular:  Normal rate, Regular rhythm, No murmur.    Gastrointestinal:  Soft, Non-tender, Non-distended, Normal bowel sounds, No organomegaly.    Musculoskeletal:  Normal range of motion.    Neurologic:  Cranial Nerves II-XII are grossly intact, Normal deep tendon reflexes.    Psychiatric:  Appropriate mood & affect.       Assessment   .      Plan   Impression and Plan:     Diagnosis     Routine sports physical exam ([id number] Z02.5).     .    Condition normal sports physical, approved for 2 years without restrictions.    Orders     Orders   Charges (Evaluation and Management):  95698 periodic preventive med est patient 12-17yrs  (Charge) (Order): Quantity: 1, Routine sports physical exam.     .

## 2022-02-15 NOTE — PROGRESS NOTES
Seen for COVID testing at [facility] per Dr. De Oliveira    O2 Sat = no result- pt had dark fake nails on   (Children under 12 do not require O2 sat)    Specimen sent to:  [facility]    PUI form faxed to [facility].

## 2022-02-15 NOTE — NURSING NOTE
Comprehensive Intake Entered On:  11/15/2021 4:57 PM CST    Performed On:  11/15/2021 4:49 PM CST by Yessenia Lynch               Summary   Chief Complaint :   c/o ongoing throat pain and drainage from tonsils, negative for strep last time she was here. States she was prescribed an antibiotic but she never picked it up because she thought she was getting better.    Menstrual Status :   Menarcheal   Weight Measured :   153.9 lb(Converted to: 153 lb 14 oz, 69.808 kg)    Height Measured :   64 in(Converted to: 5 ft 4 in, 162.56 cm)    Body Mass Index :   26.41 kg/m2 (HI)    Body Surface Area :   1.77 m2   Height/Length Estimated :   64 in(Converted to: 5 ft 4 in, 162.56 cm)    Systolic Blood Pressure :   128 mmHg   Diastolic Blood Pressure :   70 mmHg   Mean Arterial Pressure :   89 mmHg   Peripheral Pulse Rate :   76 bpm   BP Site :   Right arm   Pulse Site :   Radial artery   BP Method :   Manual   HR Method :   Manual   Temperature Tympanic :   97.9 DegF(Converted to: 36.6 DegC)    Yessenia Lynch - 11/15/2021 4:49 PM CST   Health Status   Allergies Verified? :   Yes   Medication History Verified? :   Yes   Medical History Verified? :   Yes   Pre-Visit Planning Status :   Completed   Tobacco Use? :   Never smoker   Yessenia Lynch - 11/15/2021 4:49 PM CST   Consents   Consent for Immunization Exchange :   Consent Granted   Consent for Immunizations to Providers :   Consent Granted   Yessenia Lynch - 11/15/2021 4:49 PM CST   Meds / Allergies   (As Of: 11/15/2021 4:57:36 PM CST)   Allergies (Active)   Concerta  Estimated Onset Date:   Unspecified ; Reactions:   Anxiety, Irritability ; Created By:   Chloé Zapata CMA; Reaction Status:   Active ; Category:   Drug ; Substance:   Concerta ; Type:   Side Effect ; Updated By:   Chloé Zapata CMA; Reviewed Date:   11/15/2021 4:57 PM CST      methylphenidate  Estimated Onset Date:   Unspecified ; Reactions:   Anxiety, Irritability ; Created By:   Benny TRIVEDI  Chloé; Reaction Status:   Active ; Category:   Drug ; Substance:   methylphenidate ; Type:   Side Effect ; Updated By:   Chloé Zapata CMA; Reviewed Date:   11/15/2021 4:57 PM CST      Vyvanse  Estimated Onset Date:   Unspecified ; Reactions:   Irritability ; Created By:   Jazmin Vargas MD; Reaction Status:   Active ; Category:   Drug ; Substance:   Vyvanse ; Type:   Side Effect ; Severity:   Moderate ; Updated By:   Jazmin Vargas MD; Reviewed Date:   11/15/2021 4:57 PM CST        Medication List   (As Of: 11/15/2021 4:57:36 PM CST)   Prescription/Discharge Order    amphetamine-dextroamphetamine  :   amphetamine-dextroamphetamine ; Status:   Prescribed ; Ordered As Mnemonic:   Adderall XR 25 mg oral capsule, extended release ; Simple Display Line:   25 mg, 1 cap(s), Oral, qam, 30 cap(s), 0 Refill(s) ; Ordering Provider:   Jazmin Vargas MD; Catalog Code:   amphetamine-dextroamphetamine ; Order Dt/Tm:   10/6/2021 1:46:39 PM CDT          amphetamine-dextroamphetamine  :   amphetamine-dextroamphetamine ; Status:   Prescribed ; Ordered As Mnemonic:   Adderall XR 25 mg oral capsule, extended release ; Simple Display Line:   25 mg, 1 cap(s), Oral, qam, may fill in 28 days, 30 cap(s), 0 Refill(s) ; Ordering Provider:   Jazmin Vargas MD; Catalog Code:   amphetamine-dextroamphetamine ; Order Dt/Tm:   10/6/2021 1:46:36 PM CDT

## 2022-02-15 NOTE — PROGRESS NOTES
Chief Complaint    ADHD medication check  History of Present Illness       patient present to clinic today for follow up of ADD/ADHD.  pt reports current medication is working well, denies adverse side effects, no headache, chest pain, nausea, insomnia.  She wonders if there might be better at the 25 mg dose because she hopes that this might help her to focus a little bit better.      The medication improves  ability to function at work and at home.       denies insomnia or headaches.       [place] PMDP searched, no red flags noted.  Urine drug screen is up to date.      Review of systems is negative except as per HPI including:  no fevers, chills, sore throat, runny nose, nausea, vomiting, constipation, diarrhea, rash or new skin lesions, chest pain, palpitations, slurred speech, new paresthesia, shortness of breath or wheeze.      Exam:   also see vitals below      General: alert and oriented ×3 no acute distress.      HEENT: pupils are equal round and reactive to light extraocular motion is intact. Normocephalic and atraumatic.       Hearing is grossly normal and there is no otorrhea.       Nares are patent there is no rhinorrhea.       Mucous membranes are moist and pink.      Chest: has bilateral rise with no increased work of breathing.      Cardiovascular: normal perfusion and brisk capillary refill.      Musculoskeletal: no gross focal abnormalities and normal gait.      Neuro: no gross focal abnormalities and memory seems intact.      Psychiatric: speech is clear and coherent and fluent. Patient dressed appropriately for the weather. Mood is appropriate and affect is full.                     Discussed with patient to return to clinic if symptoms worsen or do not improve and for next Annual exam.         ADD        Discussed with use of stimulant will require close monitoring.   Most common side effects are anorexia (80%), sleep disturbance  and weight loss.  Tic behaviors are more unusual but can occur.   Discussed that at times dose adjustment needs to be made and there is a possibility of addiction behavior.        Given prescription for this month and prescription for fill in 1 month              15 minutes spent with patient in direct face to face contact, > 50% of time spent counseling and coordinating care.          Physical Exam   Vitals & Measurements    T: 98.9   F (Tympanic)  HR: 74(Peripheral)  BP: 106/70  SpO2: 99%     HT: 64 in  WT: 135 lb   Assessment/Plan       ADD (attention deficit disorder) (F98.8)        Patient will let me know if she has any adverse effects using the 25 mg Adderall and would prefer to go back down to 20 mg.         Ordered:          amphetamine-dextroamphetamine, = 1 cap(s) ( 20 mg ), PO, qAM, Instructions: do not fill until 02/25/2019, # 30 cap(s), 0 Refill(s), Type: Maintenance, Pharmacy: [id number], 1 cap(s) Oral qam,Instr:do not fill until 02/25/2019, (Completed)          amphetamine-dextroamphetamine, = 1 cap(s) ( 20 mg ), PO, qAM, # 30 cap(s), 0 Refill(s), Type: Maintenance, Pharmacy: [id number], 1 cap(s) Oral qam, (Completed)          amphetamine-dextroamphetamine, = 1 cap(s) ( 25 mg ), PO, qAM, # 30 cap(s), 0 Refill(s), Type: Maintenance, Pharmacy: [id number], 1 cap(s) Oral qam, (Ordered)          02965 office outpatient visit 15 minutes (Charge), Quantity: 1, Syncope  Orthostatic hypertension  ADD (attention deficit disorder)          48887 office outpatient visit 15 minutes (Charge), Quantity: 1, ADD (attention deficit disorder)                Orders:         amphetamine-dextroamphetamine, See Instructions, Instructions: Take 1 capsule by mouth every morning, do not fill until 12/7/2018, # 30 cap(s), 0 Refill(s), Type: Soft Stop, Pharmacy: [id number], Take 1 capsule by mouth every morning, do not fill until 12/7/2018, (Completed)         amphetamine-dextroamphetamine, = 1 cap(s) ( 20 mg ), Oral, qam, # 30 cap(s), 0  Refill(s), Type: Soft Stop, Pharmacy: [id number], 1 cap(s) Oral qam, (Completed)         Pain Management Profile 8 with Confirmation, Urine* ([facility]), Specimen Type: Urine, Collection Date: 05/15/19 11:34:00 CDT         Physical Therapy Evaluation and Treatment (Request), Headache  Neck pain  Back pain  Concussion         Physical Therapy Evaluation and Treatment (Request), Headache, tension-type  Concussion  Low back pain  Neck pain         Return to Clinic (Request), Return in 4 weeks  Patient Information     Name:SENG SCHRADER      Address:      [address] [address]     Sex:Female     YOB: 2000     [phone]     Emergency Contact:SANTO BRIZUELA     MRN:[id number]     FIN:[id number]     Location:[facility]     Date of Service:05/15/2019      Primary Care Physician:       Jazmin Vargas MD, [phone]      Attending Physician:       Jazmin Vargas MD, [phone]  Problem List/Past Medical History    Ongoing     ADD (attention deficit disorder)       Comments: 'Possible'     Concussion     Needle phobia     Syncope    Historical     Viral pharyngitis  Procedure/Surgical History     No previous procedures        Medications     Loestrin 21 1/20 oral tablet: 1 tab(s), PO, Daily, 84 tab(s), 3 Refill(s).     Adderall XR 25 mg oral capsule, extended release: 25 mg, 1 cap(s), PO, qAM, 30 cap(s), 0 Refill(s).      Allergies    No Known Medication Allergies  Social History    Smoking Status - 05/15/2019     Never smoker     Alcohol      Never, 09/05/2018     Home and Environment      Lives with Father, Stepmother. Substance abuse in household: No. Smoker in household: No. Risks in environment: Does not wear helmet., 08/11/2015     Sexual      Sexually active: No., 08/11/2015     Substance Abuse      Never, 08/11/2015     Tobacco      Never, Household tobacco concerns: No., 05/13/2011  Family History    Gastro-esophageal  reflux disease: Mother.    Suicide..: Mother.  Immunizations      Vaccine Date Status      meningococcal conjugate vaccine 11/16/2018 Given      Hep A, pediatric/adolescent 11/16/2018 Given      influenza virus vaccine, inactivated 11/09/2018 Given      human papillomavirus vaccine 06/03/2014 Given      human papillomavirus vaccine 01/17/2014 Given      DTaP-IPV 01/17/2014 Recorded      tetanus/diphth/pertuss (Tdap) adult/adol 01/17/2014 Recorded      influenza (LAIV) 11/11/2013 Given      human papillomavirus vaccine 11/11/2013 Given      meningococcal conjugate vaccine 07/19/2012 Given      tetanus/diphth/pertuss (Tdap) adult/adol 07/19/2012 Given      influenza 09/22/2011 Given      influenza virus vaccine, inactivated 10/26/2010 Given      influenza, H1N1, live 01/18/2010 Recorded      influenza, H1N1, live 12/15/2009 Recorded      varicella 06/01/2009 Recorded      DTaP 07/20/2006 Recorded      MMR (measles/mumps/rubella) 07/20/2006 Recorded      IPV 07/20/2006 Recorded      DTaP 10/03/2002 Recorded      Hep B-Hib 10/03/2002 Recorded      varicella 07/11/2002 Recorded      MMR (measles/mumps/rubella) 07/11/2002 Recorded      pneumococcal (PCV7) 03/28/2002 Recorded      DTaP 01/03/2002 Recorded      pneumococcal (PCV7) 01/03/2002 Recorded      IPV 01/03/2002 Recorded      DTaP 2001 Recorded      Hep B-Hib 2001 Recorded      IPV 2001 Recorded      DTaP 2001 Recorded      pneumococcal (PCV7) 2001 Recorded      Hep B-Hib 2001 Recorded      IPV 2001 Recorded  only this month was prescribed, not 2 months

## 2022-02-15 NOTE — PROGRESS NOTES
Patient:   SENG SCHRADER            MRN: [id number]            FIN: [id number]               Age:   17 years     Sex:  Female     :  2000   Associated Diagnoses:   None   Author:   Jessica Gonzalez      Visit Information      Date of Service: 08/15/2017 01:39 pm  Performing Location: [facility]  Encounter#: [id number]      Primary Care Provider (PCP):  Radha Beatty    NPI# [id number]      Chief Complaint   8/15/2017 1:42 PM CDT    Patient is here to start OCP to help with heavy, painful periods and acne.      History of Present Illness   Pt. here with her Mother to discuss painful periods that are regular and heavy. She also has acne, on her face and back. Previously was on an antibiotic cream, which helped. Non smoker. Has had the HPV vaccine. No personal or family history of blood clotting disorder or personal history of migraines or hypertension.       Review of Systems   Constitutional:  Negative.    Genitourinary:  Negative.    Gynecologic:  Negative except as documented in history of present illness.    Integumentary:  Negative except as documented in history of present illness.       Health Status   Allergies:    Allergic Reactions (Selected)  No known allergies   Medications:  (Selected)   Prescriptions  Prescribed  Loestrin 21  oral tablet: 1 tab(s), PO, Daily, # 84 tab(s), 3 Refill(s), Type: Maintenance, Pharmacy: [id number], 1 tab(s) po daily  erythromycin 2% topical gel: See Instructions, Instructions: APPLY TWICE DAILY, # 30 gm, 1 Refill(s), Pharmacy: [id number], APPLY TWICE DAILY   Problem list:    All Problems  ADD (Attention Deficit Disorder) / ICD-9-.00 / Confirmed  Viral pharyngitis / ICD-9- / Confirmed      Physical Examination   Last Menstrual Period: 2017   Vital Signs   8/15/2017 1:42 PM CDT Peripheral Pulse Rate 61 bpm    HR Method Electronic    Systolic Blood Pressure 114 mmHg    Diastolic Blood Pressure 64  mmHg    Mean Arterial Pressure 81 mmHg    BP Site Right arm    BP Method Electronic      Measurements from flowsheet : Measurements   8/15/2017 1:42 PM CDT Height Measured - Standard 63.5 in    Weight Measured - Standard 167 lb    BSA 1.84 m2    Body Mass Index 29.12 kg/m2    Body Mass Index Percentile 95.17    Ht/Wt Measurement Refused by Patient? No      General:  Alert and oriented, No acute distress.    Integumentary:  pustules and papules on face and back. .    Neurologic:  Alert.    Psychiatric:  Cooperative, Appropriate mood & affect.       Impression and Plan   Plan:  1) Start patient on OCP, low estrogen dose  2) Benefits and risks of OCPs  discussed with patient and her Mother  3) Refill medication for acne.  4) Pt. should check BP in 3 weeks  5) RTC in one year.    Patient Instructions:       Counseled: Patient, Verbalized understanding.

## 2022-02-15 NOTE — PROGRESS NOTES
Chief Complaint    f/u labs and concussion. Head still hurting.  History of Present Illness      Patient is here today with her dad to follow-up of her concussion.  Her concussion was result of a syncopal episode.  Patient reports she had some presyncopal feelings earlier today.  She hates the taste of salt and is nodding started to increase this in her diet and is not making a point to push fluids.  She says that she is tried to increase her fluid consumption but sounds like there is room for improvement there.  Today in clinic we did repeat her orthostatic blood pressure and pulses.  Today this is a positive.  EKG was also done.  This was normal.  See separate EKG note.       Her scat 2 today is higher than it was last time.  Before she only had 20 of 22 symptoms with a total score of 55 now she is got 22 of 22 symptoms_is 58.  Her headache is quite severe and crushing her head which causes her to have some of these other symptoms.  She made it to school for half a day.  Yesterday she missed.  This however was secondary to some gastroenteritis type symptoms.  These have resolved.       She also has a history of a needle phobia.  She reports that taking the 0.5 mg of Ativan did not help.  Review of Systems      Please see scat 2 documented in clinic that is scanned in.  Also as per HPI  Physical Exam   Vitals & Measurements    T: 98.0   F (Tympanic)  HR: 74(Peripheral)  BP: 114/66  BP: 110/72(Sitting)  BP: 108/73(Standing)  BP: 109/70(Supine)  SpO2: 99%     WT: 152.4 lb       General patient is alert and oriented x3 in no acute distress       HEENT pupils equal round reactive to light and extraocular motion is intact.  Hearing is grossly normal.  Mucous membranes are moist and pink nares are patent there is no rhinorrhea       She has no nuchal rigidity and no anterior or posterior cervical lymphadenopathy and no thyromegaly       Chest has bilateral rise with no increased work of breathing       Cardiovascular  normal tissue perfusion and brisk capillary refill       Skin is warm and dry there is good turgor       Neuro cranial nerves II through XII are grossly intact, patient's speech is fluent.  Her memory is intact, her gait is normal.  She seems to be able to speak to me today with less mental fogginess.       Musculoskeletal patient has hypertonicity in her upper traps and bilateral occipital muscles and levator scapulae muscles.  Assessment/Plan       Concussion (S06.0X9A), Concussion (S06.0X9A)         We will continue with the note for school please see education materials today.  Would like patient to be able to rest when she needs to limit her screen time take it easy suspect that she will continue to improve.  Would like her to also get started in physical therapy.  She has a needle phobia so I do not think that occipital nerve blocks would be a good treatment option for her.  She can certainly do some over-the-counter ibuprofen as needed.         Shared with patient that I suspect that she has pots and that she would benefit from really increasing her sodium intake.  If she does not like the taste of salt that she needs to find some foods that are processed and have a high salt content.  Would like to increase her p.o. hydration also.  She did have a water bottle with her all of the time.  I had also like to send her to cardiology.  Seems reasonable to get a Holter study for her but because she is still a child I would need her to see cardiology to do this.  She and her dad are willing to do this.  Would like her to return to see me in about 2 weeks certainly sooner if needed but in the meantime wanted to get involved in physical therapy.         Orders:          LORazepam, = 1 tab(s) ( 0.5 mg ), Oral, once, Instructions: take 1 hour prior to appointment, # 1 tab(s), 0 Refill(s), Type: Soft Stop, Pharmacy: [facility] #[id number], 1 tab(s) Oral once,Instr:take 1 hour prior to appointment, (Completed)           79509 ecg routine ecg w/least 12 lds w/i+r (Charge), Quantity: 1, Concussion          Physical Therapy Evaluation and Treatment (Request), Headache, tension-type  Concussion  Low back pain  Neck pain                Headache, tension-type (G44.209)         Orders:          Physical Therapy Evaluation and Treatment (Request), Headache, tension-type  Concussion  Low back pain  Neck pain                Low back pain (M54.5)         Orders:          Physical Therapy Evaluation and Treatment (Request), Headache, tension-type  Concussion  Low back pain  Neck pain                Neck pain (M54.2)         Orders:          Physical Therapy Evaluation and Treatment (Request), Headache, tension-type  Concussion  Low back pain  Neck pain                Orthostatic hypotension (I95.1)         Orders:          Referral (Request), 11/20/18 17:24:00 CST, Referred to: Cardiology, Syncope  Orthostatic hypotension                Syncope (R55)         Orders:          LORazepam, = 1 tab(s) ( 0.5 mg ), Oral, once, Instructions: take 1 hour prior to appointment, # 1 tab(s), 0 Refill(s), Type: Soft Stop, Pharmacy: [facility] #[id number], 1 tab(s) Oral once,Instr:take 1 hour prior to appointment, (Completed)          Referral (Request), 11/20/18 17:24:00 CST, Referred to: Cardiology, Syncope  Orthostatic hypotension           Patient Information     Name:SENG SCHRADER      Address:      [address] [address]     Sex:Female     YOB: 2000     [phone]     Emergency Contact:RAGHU SCHRADER     MRN:[id number]     FIN:[id number]     Location[email or web address]     Date of Service:11/20/2018      Primary Care Physician:       Jazmin Vargas MD, [phone]      Attending Physician:       Jazmin Vargas MD, [phone]  Problem List/Past Medical History    Ongoing     ADD (attention deficit disorder)       Comments: 'Possible'     Concussion     Needle phobia      Syncope    Historical     Viral pharyngitis  Procedure/Surgical History     No previous procedures  Medications        Loestrin 21 1/20 oral tablet: 1 tab(s), PO, Daily, 84 tab(s), 3 Refill(s).        Adderall XR 20 mg oral capsule, extended release: 20 mg, 1 cap(s), PO, qAM, 30 cap(s), 0 Refill(s).        amphetamine-dextroamphetamine 20 mg oral capsule, extended release: See Instructions, Take 1 capsule by mouth every morning, 30 cap(s), 0 Refill(s).        amphetamine-dextroamphetamine 20 mg oral capsule, extended release: See Instructions, Take 1 capsule by mouth every morning, do not fill until 12/7/2018, 30 cap(s), 0 Refill(s).         Allergies    No Known Medication Allergies  Social History    Smoking Status - 11/20/2018     Never smoker     Alcohol      Never, 09/05/2018     Home and Environment      Lives with Father, Stepmother. Substance abuse in household: No. Smoker in household: No. Risks in environment: Does not wear helmet., 08/11/2015     Sexual      Sexually active: No., 08/11/2015     Substance Abuse      Never, 08/11/2015     Tobacco      Never, Household tobacco concerns: No., 05/13/2011  Family History    Gastro-esophageal reflux disease: Mother.    Suicide..: Mother.  Immunizations      Vaccine Date Status      meningococcal conjugate vaccine 11/16/2018 Given      Hep A, pediatric/adolescent 11/16/2018 Given      influenza virus vaccine, inactivated 11/09/2018 Given      human papillomavirus vaccine 06/03/2014 Given      human papillomavirus vaccine 01/17/2014 Given      DTaP-IPV 01/17/2014 Recorded      tetanus/diphth/pertuss (Tdap) adult/adol 01/17/2014 Recorded      influenza (LAIV) 11/11/2013 Given      human papillomavirus vaccine 11/11/2013 Given      meningococcal conjugate vaccine 07/19/2012 Given      tetanus/diphth/pertuss (Tdap) adult/adol 07/19/2012 Given      influenza 09/22/2011 Given      influenza virus vaccine, inactivated 10/26/2010 Given      influenza, H1N1, live  01/18/2010 Recorded      influenza, H1N1, live 12/15/2009 Recorded      varicella 06/01/2009 Recorded      DTaP 07/20/2006 Recorded      MMR (measles/mumps/rubella) 07/20/2006 Recorded      IPV 07/20/2006 Recorded      DTaP 10/03/2002 Recorded      Hep B-Hib 10/03/2002 Recorded      varicella 07/11/2002 Recorded      MMR (measles/mumps/rubella) 07/11/2002 Recorded      pneumococcal (PCV7) 03/28/2002 Recorded      DTaP 01/03/2002 Recorded      pneumococcal (PCV7) 01/03/2002 Recorded      IPV 01/03/2002 Recorded      DTaP 2001 Recorded      Hep B-Hib 2001 Recorded      IPV 2001 Recorded      DTaP 2001 Recorded      pneumococcal (PCV7) 2001 Recorded      Hep B-Hib 2001 Recorded      IPV 2001 Recorded  Lab Results          Lab Results (Last 4 results within 90 days)           Sodium Level: 140 mmol/L [135 mmol/L - 146 mmol/L] (11/16/18 15:53:00)          Potassium Level: 4.5 mmol/L [3.8 mmol/L - 5.1 mmol/L] (11/16/18 15:53:00)          Chloride Level: 107 mmol/L [98 mmol/L - 110 mmol/L] (11/16/18 15:53:00)          CO2 Level: 27 mmol/L [20 mmol/L - 32 mmol/L] (11/16/18 15:53:00)          Glucose Level: 79 mg/dL [65 mg/dL - 99 mg/dL] (11/16/18 15:53:00)          BUN: 10 mg/dL [7 mg/dL - 20 mg/dL] (11/16/18 15:53:00)          Creatinine Level: 0.62 mg/dL [0.5 mg/dL - 1 mg/dL] (11/16/18 15:53:00)          BUN/Creat Ratio: NOT APPLICABLE [6  - 22] (11/16/18 15:53:00)          Calcium Level: 9.6 mg/dL [8.9 mg/dL - 10.4 mg/dL] (11/16/18 15:53:00)          Phosphorus Level: 4.4 mg/dL [2.5 mg/dL - 4.5 mg/dL] (11/16/18 15:53:00)          Magnesium Level: 2.1 mg/dL [1.5 mg/dL - 2.5 mg/dL] (11/16/18 15:53:00)          TSH: 0.98 mIU/L [0.5 mg/dL - 1 mg/dL] (11/16/18 15:53:00)          WBC: 4.9 [4.5  - 13] (11/16/18 15:53:00)          RBC: 4.59 [3.8  - 5.1] (11/16/18 15:53:00)          Hgb: 13.6 gm/dL [11.5 gm/dL - 15.3 gm/dL] (11/16/18 15:53:00)          Hct: 39.7 % [34 % - 46 %] (11/16/18  15:53:00)          MCV: 86.5 fL [78 fL - 98 fL] (11/16/18 15:53:00)          MCH: 29.6 pg [25 pg - 35 pg] (11/16/18 15:53:00)          MCHC: 34.3 gm/dL [31 gm/dL - 36 gm/dL] (11/16/18 15:53:00)          RDW: 13.1 % [11 % - 15 %] (11/16/18 15:53:00)          Platelet: 260 [140  - 400] (11/16/18 15:53:00)          MPV: 10.2 fL [7.5 fL - 12.5 fL] (11/16/18 15:53:00)          Lymphocytes: 45.3 % [2.5 mg/dL - 4.5 mg/dL] (11/16/18 15:53:00)          Abs Lymphocytes: 2220 [1200  - 5200] (11/16/18 15:53:00)          Neutrophils: 42.8 % [0  - 200] (11/16/18 15:53:00)          Abs Neutrophils: 2097 [1800  - 8000] (11/16/18 15:53:00)          Monocytes: 8.2 % [2.5 mg/dL - 4.5 mg/dL] (11/16/18 15:53:00)          Abs Monocytes: 402 [200  - 900] (11/16/18 15:53:00)          Eosinophils: 3.1 % [2.5 mg/dL - 4.5 mg/dL] (11/16/18 15:53:00)          Abs Eosinophils: 152 [15  - 500] (11/16/18 15:53:00)          Basophils: 0.6 % [2.5 mg/dL - 4.5 mg/dL] (11/16/18 15:53:00)          Abs Basophils: 29 [0  - 200] (11/16/18 15:53:00)
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
## 2022-02-15 NOTE — PROGRESS NOTES
Chief Complaint    med consult. rocky ribeiro verbal consent okay to see and treat if needed  History of Present Illness      patient present to clinic today for follow up ADHD she reports that she is tolerating her Adderall XR 10 mg without any side effects.  She is able to sleep at night.  She does not know that it is a high enough dose to actually be helping her.  She has not found any improvement in her ability to concentrate.  She does report that on her biology class her last place was N/A.  She got 15 out of 16 points with only one-point being taken off her calling herself [facility] instead of Charlotte Ribeiro.       Review of systems is negative except as per HPI including:  no fevers, chills, sore throat, runny nose, nausea, vomiting, constipation, diarrhea, rash or new skin lesions, chest pain, palpitations, slurred speech, new paresthesia, shortness of breath or wheeze.      Exam:      General: alert and oriented ×3 no acute distress.      HEENT: pupils are equal round and reactive to light extraocular motion is intact. Normocephalic and atraumatic.       Hearing is grossly normal and there is no otorrhea.       Nares are patent there is no rhinorrhea.       Mucous membranes are moist and pink.      Chest: has bilateral rise with no increased work of breathing.      Cardiovascular: normal perfusion and brisk capillary refill.      Musculoskeletal: no gross focal abnormalities and normal gait.      Neuro: no gross focal abnormalities and memory seems intact.      Psychiatric: speech is clear and coherent and fluent. Patient dressed appropriately for the weather. Mood is appropriate and affect is full.                     Discussed with patient to return to clinic if symptoms worsen or do not improve  Physical Exam   Vitals & Measurements    HR: 72(Peripheral)  BP: 112/70     WT: 158.4 lb   Assessment/Plan       ADD (attention deficit disorder) (F98.8)        15 minutes spent with patient in direct face to face  contact, > 50% of time spent counseling and coordinating care.  Increased Adderall from 10 to 20 mg every morning.  Will have patient return to clinic in 1 month but sooner if needed.         Orders:          amphetamine-dextroamphetamine, = 1 cap(s) ( 20 mg ), PO, qAM, # 30 cap(s), 0 Refill(s), Type: Maintenance, Pharmacy: [facility] #[id number], 1 cap(s) Oral qam, (Ordered)          Return to Clinic (Request), Return in 4 weeks                Contraception management (Z30.9)         Orders:          POC HCG, URINE* ([facility]), Specimen Type: Urine, Collection Date: 10/02/18 14:56:00 CDT           Patient Information     Name:SENG SCHRADER      Address:      [address] [address]     Sex:Female     YOB: 2000     [phone]     Emergency Contact:RAGHU SCHRADER     MRN:[id number]     FIN:[id number]     Location:[facility]     Date of Service:10/02/2018      Primary Care Physician:       Jazmin Vargas MD, [phone]      Attending Physician:       Jazmin Vargas MD, [phone]  Problem List/Past Medical History    Ongoing     ADD (attention deficit disorder)       Comments: 'Possible'    Historical     Viral pharyngitis  Procedure/Surgical History     No previous procedures        Medications     Adderall XR 10 mg oral capsule, extended release: 10 mg, 1 cap(s), PO, qAM, 30 cap(s), 0 Refill(s).     Loestrin 21 1/20 oral tablet: 1 tab(s), PO, Daily, 84 tab(s), 3 Refill(s).     Adderall XR 20 mg oral capsule, extended release: 20 mg, 1 cap(s), PO, qAM, 30 cap(s), 0 Refill(s).          Allergies    No Known Medication Allergies  Social History    Smoking Status - 10/02/2018     Never smoker     Alcohol      Never, 09/05/2018     Home and Environment      Lives with Father, Stepmother. Substance abuse in household: No. Smoker in household: No. Risks in environment: Does not wear helmet., 08/11/2015     Sexual      Sexually active: No.,  08/11/2015     Substance Abuse      Never, 08/11/2015     Tobacco      Never, Household tobacco concerns: No., 05/13/2011  Family History    Gastro-esophageal reflux disease: Mother.    Suicide..: Mother.  Immunizations      Vaccine Date Status      human papillomavirus vaccine 06/03/2014 Given      human papillomavirus vaccine 01/17/2014 Given      DTaP-IPV 01/17/2014 Recorded      tetanus/diphth/pertuss (Tdap) adult/adol 01/17/2014 Recorded      influenza (LAIV) 11/11/2013 Given      human papillomavirus vaccine 11/11/2013 Given      meningococcal conjugate vaccine 07/19/2012 Given      tetanus/diphth/pertuss (Tdap) adult/adol 07/19/2012 Given      influenza 09/22/2011 Given      influenza virus vaccine, inactivated 10/26/2010 Given      influenza, H1N1, live 01/18/2010 Recorded      influenza, H1N1, live 12/15/2009 Recorded      varicella 06/01/2009 Recorded      IPV 07/20/2006 Recorded      MMR (measles/mumps/rubella) 07/20/2006 Recorded      DTaP 07/20/2006 Recorded      Hep B-Hib 10/03/2002 Recorded      DTaP 10/03/2002 Recorded      MMR (measles/mumps/rubella) 07/11/2002 Recorded      varicella 07/11/2002 Recorded      pneumococcal (PCV7) 03/28/2002 Recorded      IPV 01/03/2002 Recorded      pneumococcal (PCV7) 01/03/2002 Recorded      DTaP 01/03/2002 Recorded      IPV 2001 Recorded      Hep B-Hib 2001 Recorded      DTaP 2001 Recorded      IPV 2001 Recorded      Hep B-Hib 2001 Recorded      pneumococcal (PCV7) 2001 Recorded      DTaP 2001 Recorded

## 2022-02-15 NOTE — TELEPHONE ENCOUNTER
---------------------From: Bonnie Pena RN (Phone Messages Pool ([address] [place] [address])) To: Advanced Practice Provider [place] ([address] [place] [address]);   Sent: 8/8/2019 2:03:16 PM CDTSubject: Adderall refill Mom calls at [phone]. Requests refill of Adderall sent to [facility] of last office visit: 5/15/19 Prescription for Adderall xr 25mg po qam last written on: 6/22/19 Quantity: 30 Refill(s): 0CSA: noneRTC: none---------------------From: Sterling VALLADARES, Steven ENNIS (Advanced Practice Provider Pool ([facility])) To: Phone [employer] Pool ([id number]);   Sent: 8/8/2019 2:26:31 PM CDTSubject: RE: Adderall refill will renew for 1 month,  [place] PDMP consulted, no irregularities notedneeds clinic visit next month for further refills2:42pm Called and spoke with mom, who called in refill. She will let patient know that she needs a visit prior to next refill.

## 2022-02-15 NOTE — NURSING NOTE
Comprehensive Intake Entered On:  12/15/2020 3:18 PM CST    Performed On:  12/15/2020 3:17 PM CST by Joaquina Gallego CMA               Summary   Chief Complaint :   consent for video visit for COVID test   Menstrual Status :   Menarcheal   Height/Length Estimated :   64 in(Converted to: 5 ft 4 in, 162.56 cm)    Joaquina Gallego CMA - 12/15/2020 3:17 PM CST   Health Status   Allergies Verified? :   Yes   Medication History Verified? :   Yes   Medical History Verified? :   Yes   Tobacco Use? :   Never smoker   Joaquina Gallego CMA - 12/15/2020 3:17 PM CST   Consents   Consent for Immunization Exchange :   Consent Granted   Consent for Immunizations to Providers :   Consent Granted   Joaquina Gallego CMA - 12/15/2020 3:17 PM CST   Meds / Allergies   (As Of: 12/15/2020 3:18:15 PM CST)   Allergies (Active)   Vyvanse  Estimated Onset Date:   Unspecified ; Reactions:   Irritability ; Created By:   Jazmin Vragas MD; Reaction Status:   Active ; Category:   Drug ; Substance:   Vyvanse ; Type:   Side Effect ; Severity:   Moderate ; Updated By:   Jazmin Vargas MD; Reviewed Date:   12/15/2020 3:17 PM CST        Medication List   (As Of: 12/15/2020 3:18:15 PM CST)   Prescription/Discharge Order    ethinyl estradiol-norethindrone  :   ethinyl estradiol-norethindrone ; Status:   Prescribed ; Ordered As Mnemonic:   ethinyl estradiol-norethindrone 20 mcg-1 mg oral tablet ; Simple Display Line:   1 tab(s), Oral, daily, 84 tab(s), 0 Refill(s) ; Ordering Provider:   Jazmin Vargas MD; Catalog Code:   ethinyl estradiol-norethindrone ; Order Dt/Tm:   9/21/2020 10:57:52 AM CDT          methylphenidate  :   methylphenidate ; Status:   Prescribed ; Ordered As Mnemonic:   methylphenidate 10 mg oral tablet ; Simple Display Line:   15 mg, 1.5 tab(s), Oral, bid, 90 tab(s), 0 Refill(s) ; Ordering Provider:   Jazmin Vargas MD; Catalog Code:   methylphenidate ; Order Dt/Tm:   9/11/2020 6:19:54 PM CDT          methylphenidate  :    methylphenidate ; Status:   Prescribed ; Ordered As Mnemonic:   methylphenidate 10 mg oral tablet ; Simple Display Line:   15 mg, 1.5 tab(s), Oral, bid, 90 tab(s), 0 Refill(s) ; Ordering Provider:   Jazmin Vargas MD; Catalog Code:   methylphenidate ; Order Dt/Tm:   3/24/2020 5:30:34 PM CDT            ID Risk Screen   Recent Travel History :   No recent travel   Family Member Travel History :   No recent travel   Other Exposure to Infectious Disease :   Unknown   Joaquina Gallego CMA - 12/15/2020 3:17 PM CST   Social History   Social History   (As Of: 12/15/2020 3:18:15 PM CST)   Alcohol:        Never   (Last Updated: 9/5/2018 8:33:10 AM CDT by Nadiya Sinclair)          Tobacco:        Never, Household tobacco concerns: No.   (Last Updated: 5/13/2011 11:48:42 AM CDT by Lorena Hood)   Never (less than 100 in lifetime)   (Last Updated: 12/15/2020 3:18:12 PM CST by Joaquina Gallego CMA)          Electronic Cigarette/Vaping:        Electronic Cigarette Use: Never.   (Last Updated: 12/15/2020 3:18:12 PM CST by Joaquina Gallego CMA)          Substance Abuse:        Never   (Last Updated: 8/11/2015 2:20:36 PM CDT by Henry Florian CMA)          Home/Environment:        Lives with Father, Stepmother.  Substance abuse in household: No.  Smoker in household: No.  Risks in environment: Does not wear helmet.   (Last Updated: 8/11/2015 2:25:12 PM CDT by Henry Florian CMA)          Sexual:        Sexually active: No.   (Last Updated: 8/11/2015 2:21:05 PM CDT by Henry Florian CMA)

## 2022-02-15 NOTE — NURSING NOTE
Comprehensive Intake Entered On:  9/13/2019 11:55 AM CDT    Performed On:  9/13/2019 11:52 AM CDT by Alice Lucas CMA   Chief Complaint :   patient here today for an rx refill- adderall    Last Menstrual Period :   9/6/2019 CDT   Menstrual Status :   Menarcheal   Weight Measured :   137.4 lb(Converted to: 137 lb 6 oz, 62.32 kg)    Systolic Blood Pressure :   120 mmHg   Diastolic Blood Pressure :   70 mmHg   Mean Arterial Pressure :   87 mmHg   Peripheral Pulse Rate :   103 bpm (HI)    BP Site :   Right arm   BP Method :   Manual   HR Method :   Electronic   Temperature Tympanic :   97.9 DegF(Converted to: 36.6 DegC)    Oxygen Saturation :   98 %   Alice Lucas CMA - 9/13/2019 11:52 AM CDT   Health Status   Allergies Verified? :   Yes   Medication History Verified? :   Yes   Pre-Visit Planning Status :   N/A   Tobacco Use? :   Never smoker   Alice Lucas CMA - 9/13/2019 11:52 AM CDT   Consents   Consent for Immunization Exchange :   Consent Granted   Consent for Immunizations to Providers :   Consent Granted   Alice Lucas CMA - 9/13/2019 11:52 AM CDT   Meds / Allergies   (As Of: 9/13/2019 11:55:26 AM CDT)   Allergies (Active)   No Known Medication Allergies  Estimated Onset Date:   Unspecified ; Created By:   Deanne Schmitt CMA; Reaction Status:   Active ; Category:   Drug ; Substance:   No Known Medication Allergies ; Type:   Allergy ; Updated By:   Deanne Schmitt CMA; Reviewed Date:   8/28/2019 2:23 PM CDT        Medication List   (As Of: 9/13/2019 11:55:26 AM CDT)   Prescription/Discharge Order    amphetamine-dextroamphetamine  :   amphetamine-dextroamphetamine ; Status:   Prescribed ; Ordered As Mnemonic:   Adderall XR 25 mg oral capsule, extended release ; Simple Display Line:   25 mg, 1 cap(s), PO, qAM, fill on or after 8/8/2019, 30 cap(s), 0 Refill(s) ; Ordering Provider:   Steven Katz PA-C; Catalog Code:   amphetamine-dextroamphetamine ; Order Dt/Tm:   8/8/2019 2:25:01 PM           ethinyl estradiol-norethindrone  :   ethinyl estradiol-norethindrone ; Status:   Prescribed ; Ordered As Mnemonic:   Loestrin 21 1/20 oral tablet ; Simple Display Line:   1 tab(s), PO, Daily, 84 tab(s), 3 Refill(s) ; Ordering Provider:   Jazmin Vargas MD; Catalog Code:   ethinyl estradiol-norethindrone ; Order Dt/Tm:   8/30/2018 6:03:20 PM

## 2022-02-23 ENCOUNTER — OFFICE VISIT - RIVER FALLS (OUTPATIENT)
Dept: FAMILY MEDICINE | Facility: CLINIC | Age: 22
End: 2022-02-23

## 2022-03-02 VITALS
RESPIRATION RATE: 16 BRPM | DIASTOLIC BLOOD PRESSURE: 60 MMHG | HEIGHT: 64 IN | OXYGEN SATURATION: 100 % | SYSTOLIC BLOOD PRESSURE: 110 MMHG | HEART RATE: 72 BPM | SYSTOLIC BLOOD PRESSURE: 90 MMHG | RESPIRATION RATE: 16 BRPM | DIASTOLIC BLOOD PRESSURE: 80 MMHG | HEART RATE: 73 BPM | TEMPERATURE: 98.3 F | BODY MASS INDEX: 27.12 KG/M2

## 2022-03-02 NOTE — NURSING NOTE
Rapid Strep POC Entered On:  1/8/2022 1:53 PM CST    Performed On:  11/15/2021 1:53 PM CST by Brii Johnson               Rapid Strep POC   Rapid Strep POC :   Negative   POC Test Comments :   [facility]   Brii Johnson - 1/8/2022 1:53 PM CST

## 2022-03-02 NOTE — NURSING NOTE
"Comprehensive Intake Entered On:  1/25/2022 5:13 PM CST    Performed On:  1/25/2022 5:05 PM CST by Henry Florian CMA               Summary   Chief Complaint :   Pt here for possible concussion and left ankle/foot and rib pain from fall she had out of \"a hot tub\" x 4 days.  DOI 1/22/22   Menstrual Status :   Menarcheal   Height Measured :   64 in(Converted to: 5 ft 4 in, 162.56 cm)    Height/Length Estimated :   64 in(Converted to: 5 ft 4 in, 162.56 cm)    Ht/Wt Measurement Refused by Patient? :   Yes   Systolic Blood Pressure :   110 mmHg   Diastolic Blood Pressure :   80 mmHg   Mean Arterial Pressure :   90 mmHg   Peripheral Pulse Rate :   72 bpm   BP Site :   Right arm   Pulse Site :   Radial artery   Temperature Tympanic :   98.3 DegF(Converted to: 36.8 DegC)    Respiratory Rate :   16 br/min   Henry Florian CMA - 1/25/2022 5:05 PM CST   Health Status   Allergies Verified? :   Yes   Medication History Verified? :   Yes   Medical History Verified? :   Yes   Pre-Visit Planning Status :   Not completed   Tobacco Use? :   Never smoker   Henry Florian CMA - 1/25/2022 5:05 PM CST   Meds / Allergies   (As Of: 1/25/2022 5:13:38 PM CST)   Allergies (Active)   Concerta  Estimated Onset Date:   Unspecified ; Reactions:   Anxiety, Irritability ; Created By:   Chloé Zapata CMA; Reaction Status:   Active ; Category:   Drug ; Substance:   Concerta ; Type:   Side Effect ; Updated By:   Chloé Zapata CMA; Reviewed Date:   1/25/2022 5:10 PM CST      methylphenidate  Estimated Onset Date:   Unspecified ; Reactions:   Anxiety, Irritability ; Created By:   Chloé Zapata CMA; Reaction Status:   Active ; Category:   Drug ; Substance:   methylphenidate ; Type:   Side Effect ; Updated By:   Chloé Zapata CMA; Reviewed Date:   1/25/2022 5:10 PM CST      Vyvanse  Estimated Onset Date:   Unspecified ; Reactions:   Irritability ; Created By:   Jazmin Vargas MD; Reaction Status:   Active ; Category:   Drug ; Substance:   Vyvanse ; " Type:   Side Effect ; Severity:   Moderate ; Updated By:   Jazmin Vargas MD; Reviewed Date:   1/25/2022 5:10 PM CST        Medication List   (As Of: 1/25/2022 5:13:38 PM CST)   Prescription/Discharge Order    amphetamine-dextroamphetamine  :   amphetamine-dextroamphetamine ; Status:   Prescribed ; Ordered As Mnemonic:   Adderall XR 25 mg oral capsule, extended release ; Simple Display Line:   25 mg, 1 cap(s), Oral, qam, 30 cap(s), 0 Refill(s) ; Ordering Provider:   Jazmin Vargas MD; Catalog Code:   amphetamine-dextroamphetamine ; Order Dt/Tm:   10/6/2021 1:46:39 PM CDT          amphetamine-dextroamphetamine  :   amphetamine-dextroamphetamine ; Status:   Prescribed ; Ordered As Mnemonic:   Adderall XR 25 mg oral capsule, extended release ; Simple Display Line:   25 mg, 1 cap(s), Oral, qam, may fill in 28 days, 30 cap(s), 0 Refill(s) ; Ordering Provider:   Jazmin Vargas MD; Catalog Code:   amphetamine-dextroamphetamine ; Order Dt/Tm:   10/6/2021 1:46:36 PM CDT            Social History   Social History   (As Of: 1/25/2022 5:13:38 PM CST)   Alcohol:        Never   (Last Updated: 9/5/2018 8:33:10 AM CDT by Nadiya Sinclair)          Tobacco:        Never, Household tobacco concerns: No.   (Last Updated: 5/13/2011 11:48:42 AM CDT by Lorena Hood)   Never (less than 100 in lifetime)   (Last Updated: 12/15/2020 3:18:12 PM CST by Joaquina Gallego CMA)   Never (less than 100 in lifetime)   (Last Updated: 1/25/2022 5:06:15 PM CST by Henry Florian CMA)          Electronic Cigarette/Vaping:        Electronic Cigarette Use: Never.   (Last Updated: 1/25/2022 5:06:18 PM CST by Henry Florian CMA)          Substance Abuse:        Never   (Last Updated: 8/11/2015 2:20:36 PM CDT by Henry Florian CMA)          Home/Environment:        Lives with Father, Stepmother.  Substance abuse in household: No.  Smoker in household: No.  Risks in environment: Does not wear helmet.   (Last Updated: 8/11/2015 2:25:12 PM CDT by Anival  Henry TRIVEDI)          Sexual:        Sexually active: No.   (Last Updated: 8/11/2015 2:21:05 PM CDT by Henry Florian CMA)

## 2022-03-02 NOTE — TELEPHONE ENCOUNTER
---------------------  From: Jazmin Vargas MD   To: EILEEN Message Pool ([id number]);     Sent: 2/23/2022 3:24:07 PM CST  Subject: General Message     pls let pt know she has both BV and yeast on her wet prep as well as some WBC, if she is not improved after diflucan and metrogel she should let me know and we could add some doxycycline, thanksPt notified and states understanding.

## 2022-03-02 NOTE — NURSING NOTE
Comprehensive Intake Entered On:  2/23/2022 10:09 AM CST    Performed On:  2/23/2022 10:02 AM CST by Yashira Gray               Summary   Chief Complaint :   Pt c/o weird discharge with odor and whitish, itchy, itchy while having sex, Hx of BV. Given medication but did not clear the problem.    Menstrual Status :   Menarcheal   Height/Length Estimated :   64 in(Converted to: 5 ft 4 in, 162.56 cm)    Systolic Blood Pressure :   90 mmHg   Diastolic Blood Pressure :   60 mmHg   Mean Arterial Pressure :   70 mmHg   Peripheral Pulse Rate :   73 bpm   BP Site :   Right arm   BP Method :   Manual   Respiratory Rate :   16 br/min   Oxygen Saturation :   100 %   Yashira Gray - 2/23/2022 10:02 AM CST   Health Status   Allergies Verified? :   Yes   Medication History Verified? :   Yes   Yashira Gray - 2/23/2022 10:02 AM CST   Consents   Consent for Immunization Exchange :   Consent Granted   Consent for Immunizations to Providers :   Consent Granted   Yashira Gray - 2/23/2022 10:02 AM CST   Meds / Allergies   (As Of: 2/23/2022 10:09:52 AM CST)   Allergies (Active)   Concerta  Estimated Onset Date:   Unspecified ; Reactions:   Anxiety, Irritability ; Created By:   Chloé Zapata CMA; Reaction Status:   Active ; Category:   Drug ; Substance:   Concerta ; Type:   Side Effect ; Updated By:   Chloé Zapata CMA; Reviewed Date:   1/25/2022 5:15 PM CST      methylphenidate  Estimated Onset Date:   Unspecified ; Reactions:   Anxiety, Irritability ; Created By:   Chloé Zapata CMA; Reaction Status:   Active ; Category:   Drug ; Substance:   methylphenidate ; Type:   Side Effect ; Updated By:   Chloé Zapata CMA; Reviewed Date:   1/25/2022 5:15 PM CST      Vyvanse  Estimated Onset Date:   Unspecified ; Reactions:   Irritability ; Created By:   Jazmin Vargas MD; Reaction Status:   Active ; Category:   Drug ; Substance:   Vyvanse ; Type:   Side Effect ; Severity:   Moderate ; Updated By:   Jazmin Vargas MD; Reviewed  Date:   1/25/2022 5:15 PM CST        Medication List   (As Of: 2/23/2022 10:09:52 AM CST)   Prescription/Discharge Order    amphetamine-dextroamphetamine  :   amphetamine-dextroamphetamine ; Status:   Prescribed ; Ordered As Mnemonic:   Adderall XR 25 mg oral capsule, extended release ; Simple Display Line:   25 mg, 1 cap(s), Oral, qam, fill on or after 2/22/2022, 30 cap(s), 0 Refill(s) ; Ordering Provider:   Steven Katz PA-C; Catalog Code:   amphetamine-dextroamphetamine ; Order Dt/Tm:   1/25/2022 6:07:01 PM CST          amphetamine-dextroamphetamine  :   amphetamine-dextroamphetamine ; Status:   Prescribed ; Ordered As Mnemonic:   Adderall XR 25 mg oral capsule, extended release ; Simple Display Line:   25 mg, 1 cap(s), Oral, qam, may fill on or after 1/25/2022, 30 cap(s), 0 Refill(s) ; Ordering Provider:   Steven Katz PA-C; Catalog Code:   amphetamine-dextroamphetamine ; Order Dt/Tm:   1/25/2022 6:06:16 PM CST          cyclobenzaprine  :   cyclobenzaprine ; Status:   Processing ; Ordered As Mnemonic:   cyclobenzaprine 10 mg oral tablet ; Ordering Provider:   Steven Katz PA-C; Action Display:   Complete ; Catalog Code:   cyclobenzaprine ; Order Dt/Tm:   2/23/2022 10:09:33 AM CST

## 2022-03-02 NOTE — TELEPHONE ENCOUNTER
Entered by Evangelina Chan RN on January 07, 2022 3:25:27 PM CST  ---------------------  From: Evangelina Chan RN   To: [facility] ([place])    Sent: 1/7/2022 3:25:27 PM CST  Subject: Medication Management     ** Not Approved: Patient needs appointment **  amphetamine-dextroamphetamine (ADDERALL XR 25MG CAPSULE ER 24HR)  TAKE ONE CAPSULE BY MOUTH EVERY MORNING MAY REFILL IN 28 DAYS  Qty:  30 cap(s)        Days Supply:  30        Refills:  0          Substitutions Allowed     Route To Pharmacy - [facility] ([place])   Signed by Evangelina Chan RN            ------------------------------------------  From: [facility]  To: Jazmin Vargas MD  Sent: January 3, 2022 11:36:05 AM CST  Subject: Medication Management  Due: December 15, 2021 8:09:06 PM CST     ** On Hold Pending Signature **     Drug: amphetamine-dextroamphetamine (Adderall XR 25 mg oral capsule, extended release), TAKE ONE CAPSULE BY MOUTH EVERY MORNING MAY REFILL IN 28 DAYS  Quantity: 30 cap(s)  Days Supply: 30  Refills: 0  Substitutions Allowed  Notes from Pharmacy:     Dispensed Drug: amphetamine-dextroamphetamine (Adderall XR 25 mg oral capsule, extended release), TAKE ONE CAPSULE BY MOUTH EVERY MORNING MAY REFILL IN 28 DAYS  Quantity: 30 cap(s)  Days Supply: 30  Refills: 0  Substitutions Allowed  Notes from Pharmacy:  ------------------------------------------

## 2022-03-02 NOTE — PROGRESS NOTES
"   Patient:   SENG SCHRADER            MRN: [id number]            FIN: [id number]               Age:   21 years     Sex:  Female     :  2000   Associated Diagnoses:   Concussion; Head injury; Injury of left foot; Neck strain; ADHD (attention deficit hyperactivity disorder), inattentive type   Author:   Steven Katz PA-C      Chief Complaint   2022 5:05 PM CST    Pt here for possible concussion and left ankle/foot and rib pain from fall she had out of \"a hot tub\" x 4 days.  DOI 22      History of Present Illness   Chief complaint and symptoms noted above and confirmed with patient   3 days ago she had been drinking and in a hot tub, then fell when getting out, she hit her head and injured her left ankle/foot and rib  does not remember if she passed out, no nausea or vomiting  she has neck pain and feels in a fog    SCAT2:   total sxs; 104/132 sx severity score  work and activity make her head sxs worse      Review of Systems   Constitutional:  Negative.    Ear/Nose/Mouth/Throat:  Negative.    Respiratory:  Negative.    Gastrointestinal:  Negative.    Neurologic:  Headache.       Health Status   Allergies:    Nonallergic Reactions (Selected)  Moderate  Vyvanse (Irritability)  Severity Not Documented  Concerta (Anxiety and irritability)  Methylphenidate (Anxiety and irritability)   Medications:  (Selected)   Prescriptions  Prescribed  Adderall XR 25 mg oral capsule, extended release: = 1 cap(s) ( 25 mg ), Oral, qam, # 30 cap(s), 0 Refill(s), Type: Maintenance, Pharmacy: [facility] ([place]), 1 cap(s) Oral qam, 64, in, 21 9:16:00 CST, Height Measured, 155, lb, 10/05/21 15:30:00 CDT, Weight Measured  Adderall XR 25 mg oral capsule, extended release: = 1 cap(s) ( 25 mg ), Oral, qam, Instructions: may fill in 28 days, # 30 cap(s), 0 Refill(s), Type: Maintenance, Pharmacy: [facility] ([place]), 1 cap(s) Oral qam,Instr:may fill in 28 days, 64, in, 21 9:16:00 CST, Height " Measured, 155...   Problem list:    All Problems (Selected)  Uses oral contraception / SNOMED CT 51992287 / Confirmed  Concussion / SNOMED CT 4131078530 / Confirmed  Needle phobia / SNOMED CT 601128324 / Confirmed  History of COVID-19 / SNOMED CT 4424598781 / Confirmed  Syncope / SNOMED CT 018259719 / Confirmed  Chronic post-traumatic stress disorder (PTSD) / SNOMED CT 999688533 / Confirmed  ADHD (attention deficit hyperactivity disorder) / SNOMED CT 59133572 / Confirmed  ADHD (attention deficit hyperactivity disorder), inattentive type / SNOMED CT 24440199 / Confirmed      Histories   Past Medical History:    Resolved  Viral pharyngitis ([id number]):  Resolved.   Family History:    Gastro-esophageal reflux disease  Mother ()  Suicide..  Mother ()     Procedure history:    No previous procedures.   Social History:        Electronic Cigarette/Vaping Assessment            Electronic Cigarette Use: Never.      Alcohol Assessment            Never      Tobacco Assessment            Never (less than 100 in lifetime)            Never, Household tobacco concerns: No.            Never (less than 100 in lifetime)      Substance Abuse Assessment            Never      Home and Environment Assessment            Lives with Father, Stepmother.  Substance abuse in household: No.  Smoker in household: No.  Risks in               environment: Does not wear helmet.      Sexual Assessment            Sexually active: No.        Physical Examination   Vital Signs   2022 5:05 PM CST Temperature Tympanic 98.3 DegF    Peripheral Pulse Rate 72 bpm    Pulse Site Radial artery    Respiratory Rate 16 br/min    Systolic Blood Pressure 110 mmHg    Diastolic Blood Pressure 80 mmHg    Mean Arterial Pressure 90 mmHg    BP Site Right arm      Measurements from flowsheet : Measurements   2022 5:05 PM CST Ht/Wt Measurement Refused by Patient? Yes    Height Measured - Standard 64 in    Height/Length Estimated 64 in      General:   No acute distress.    HENT:  Tympanic membranes are clear, No sinus tenderness.    Neck:  Supple, no cervical spine tenderness but tenderness along the trapezeii bilaterally.    Respiratory:  Lungs are clear to auscultation.    Cardiovascular:  Normal rate, Regular rhythm, No murmur.    Musculoskeletal:  No swelling or deformation of left ankle.  Good active ROM.  Good posterior tibial and dorsal pedal pulses.  There is bruising along 3rd to 5th toes, tenderness in this area.  There is no tenderness over the either maleoli or over the base of the 5th metatarsal.  .       Review / Management   Radiology results   Results reviewed with patient.  Xray shows no fractures or dislocations of left foot per my read.  Will be over-read by radiologist.  Will call if over-read has additional information.  Cervical spine films show loss of lordotic curve but otherwise good alignment and no fractures, will be over-read by radiologist      Impression and Plan   Diagnosis     Concussion (OZR11-IB S06.0X9A).     Neck strain (CWC75-PN S16.1XXA).     Head injury (QEJ55-SW S09.90XA).     Injury of left foot (YKM16-DA S99.922A).     Plan:  advised rest,  limit screen time,  will keep her off on work on 1/28  will use cyclobenzaprine qhs for the neck pain.    Orders     Orders   Requests (Radiology):  XR Cervical Spine 3 Views (Request) (Order): Head injury  Injury of left foot  XR Foot Min 3 Views Left (Request) (Order): Head injury  Injury of left foot.     Orders   Pharmacy:  cyclobenzaprine 10 mg oral tablet (Prescribe): = 1 tab(s) ( 10 mg ), PO, qhs, PRN: for spasm, # 20 EA, 0 Refill(s), Type: Maintenance, Pharmacy: [facility] ([place]), 1 tab(s) Oral qhs,PRN:for spasm, 64, in, 1/25/2022 5:05 PM CST, Height Measured, 158, lb, 12/6/2021 2:18 PM CST, Weight Measured.     Orders   Charges (Evaluation and Management):  [id number] office o/p est mod 30-39 min (Charge) (Order): Quantity: 1, Head injury  Neck strain  Concussion   Injury of left foot  ADHD (attention deficit hyperactivity disorder), inattentive type.     Diagnosis     ADHD (attention deficit hyperactivity disorder), inattentive type (SEI51-GM F90.0).     Plan:  will also renew her Adderall for 2 months, [facility] no [facility] noted  .

## 2022-03-02 NOTE — PROGRESS NOTES
Chief Complaint    Pt c/o weird discharge with odor and whitish, itchy, itchy while having sex, Hx of BV. Given medication but did not clear the problem.  History of Present Illness       vaginal discharge and itching for months and years, waxes and wanes, had std testing done about a month ago, no new partners, she had a day earlier this week but it was enough that it drips down her leg.       chronic constitpation as a child she remembers being prescribed a white powder to take.  Sometimes she also can get bloating and pain across her upper abdomen after she eats.  Review of Systems       as per hpi  Physical Exam   Vitals & Measurements    HR: 73 (Peripheral)  RR: 16  BP: 90/60  SpO2: 100%     HT: 64 in       Exam:      General: alert and oriented ×3 no acute distress.      HEENT: pupils are equal round and reactive to light extraocular motion is intact. Normocephalic and atraumatic.       Hearing is grossly normal and there is no otorrhea.       Chest: has bilateral rise with no increased work of breathing.      Cardiovascular: normal perfusion and brisk capillary refill.      Musculoskeletal: no gross focal abnormalities and normal gait.      Neuro: no gross focal abnormalities and memory seems intact.      Psychiatric: speech is clear and coherent and fluent. Patient dressed appropriately for the weather. Mood is appropriate and affect is full.                    Discussed with patient to return to clinic if symptoms worsen or do not improve  Assessment/Plan       BV (bacterial vaginosis) (N76.0)         Ordered:          97343 office o/p est mod 30-39 min (Charge), Quantity: 1, BV (bacterial vaginosis)  Yeast vaginitis  Vaginal discharge  Constipation                Constipation (K59.04)         Ordered:          18883 office o/p est mod 30-39 min (Charge), Quantity: 1, BV (bacterial vaginosis)  Yeast vaginitis  Vaginal discharge  Constipation                Vaginal discharge (N89.8)         Ordered:           metroNIDAZOLE topical, See Instructions, Instructions: 1 kandis VAG hs x 5 nights then q Monday, Tuesday and Thursday x 6 months, # 3 EA, 3 Refill(s), Type: Soft Stop, Pharmacy: [facility] ([place]), 1 kandis VAG hs x 5 nights then q Monday, Tuesday and Thursday x 6 mo..., (Ordered)          19452 office o/p est mod 30-39 min (Charge), Quantity: 1, BV (bacterial vaginosis)  Yeast vaginitis  Vaginal discharge  Constipation          Wet Prep Vaginal (Request), Priority: STAT, Vaginal discharge                Yeast vaginitis (B37.3)         Ordered:          fluconazole, = 1 tab(s) ( 150 mg ), Oral, once, # 1 tab(s), 0 Refill(s), Type: Soft Stop, Pharmacy: [facility] ([place]), 1 tab(s) Oral once, 64, in, 01/25/22 17:05:00 CST, Height Measured, 158, lb, 12/06/21 14:18:00 CST, Weight Measured, (Ordered)          46750 office o/p est mod 30-39 min (Charge), Quantity: 1, BV (bacterial vaginosis)  Yeast vaginitis  Vaginal discharge  Constipation                Vaginal discharge with wet prep showing BV, yeast, and white blood cells.  Chart review shows that her STD testing done in December was all normal.  We will plan to treat with oral Diflucan.  Would like her to use vaginal metronidazole gel nightly for 5 nights then every Monday Tuesday and Thursday night.  In addition to this we can have her do self with loculated vaginal yogurt.  We can consider adding some doxycycline 100 mg 1 p.o. twice daily x1 week and or boric acid vaginal.       Chronic constipation with abdominal bloating would like patient to titrate over-the-counter MiraLAX for goal in a soft formed stool daily.  Once she has figured out how much MiraLAX she needs to have a soft formed stool daily would like her to add methylcellulose with additional water intake.  After doing this for 3 months she could try decreasing the MiraLAX by 25%.  If she is able to continue to have a soft formed bowel movement on a daily basis that we  will continue this dose for 3 months before attempting another decrease by 25%.  However if the decreased results and returned constipation plan like her to go back up to the previous MiraLAX dose.  If she continues to have abdominal bloating and pain could consider referral to GI.  Offered to place GI referral today however she will be going to [facility] for 2 weeks.  We can follow-up when she gets back.  She certainly welcome return to clinic sooner if needed.  Patient Information     Name:SENG SCHRADER      Address:      [address] [id number]     Sex:Female     YOB: 2000     [phone]     Emergency Contact:RAGHU SCHRADER     MRN:[id number]     FIN:[id number]     Location:[facility]     Date of Service:02/23/2022      Primary Care Physician:       Jazmin Vargas MD, [phone]      Attending Physician:       Jazmin Vargas MD, [phone]  Problem List/Past Medical History    Ongoing     ADHD (attention deficit hyperactivity disorder)     ADHD (attention deficit hyperactivity disorder), inattentive type     Chronic post-traumatic stress disorder (PTSD)     Concussion     History of COVID-19     Needle phobia     Syncope     Uses oral contraception    Historical     Viral pharyngitis  Procedure/Surgical History     No previous procedures  Medications    Adderall XR 25 mg oral capsule, extended release, 25 mg= 1 cap(s), Oral, qam    Adderall XR 25 mg oral capsule, extended release, 25 mg= 1 cap(s), Oral, qam  Allergies    Vyvanse (Irritability)    Concerta (Irritability, Anxiety)    methylphenidate (Irritability, Anxiety)  Social History    Smoking Status     Never smoker     Alcohol      Never     Electronic Cigarette/Vaping      Electronic Cigarette Use: Never.     Home/Environment      Lives with Father, Stepmother. Substance abuse in household: No. Smoker in household: No. Risks in environment: Does not wear helmet.     Sexual      Sexually active:  No.     Substance Abuse      Never     Tobacco      Never (less than 100 in lifetime)  Family History    Gastro-esophageal reflux disease: Mother.    Suicide..: Mother.  Lab Results       Lab Results (Last 4 results within 90 days)        Chlam/N. gonorrhea Comments: See comment (12/06/21 14:47:00)       Hep C Ab: NON-REACTIVE [NONREACTIVE  - NONREACTIVE] (12/06/21 14:47:00)       Hep C Signal to Cutoff: 0.02 (12/06/21 14:47:00)       HIV Ag/Ab: NON-REACTIVE [NONREACTIVE  - NONREACTIVE] (12/06/21 14:47:00)       RPR Ql: NON-REACTIVE [NONREACTIVE  - NONREACTIVE] (12/06/21 14:47:00)       Chlamydia RNA: NOT DETECTED (12/06/21 14:47:00)       Neisseria gonorrhoeae RNA: NOT DETECTED (12/06/21 14:47:00)  Immunizations       Scheduled Immunizations       Dose Date(s)       DTaP-IPV       01/17/2014       Hep A, pediatric/adolescent       11/16/2018       Hep B-Hib       2000, 2001, 10/03/2002       human papillomavirus vaccine       11/11/2013, 01/17/2014, 06/03/2014       influenza (LAIV)       11/11/2013       influenza virus vaccine, inactivated       11/09/2018, 11/18/2005, 01/12/2006, 11/17/2008       IPV       2001, 2001, 01/03/2002, 07/20/2006       meningococcal conjugate vaccine       07/19/2012, 11/16/2018       MMR (measles/mumps/rubella)       07/11/2002, 07/20/2006       tetanus/diphth/pertuss (Tdap) adult/adol       07/19/2012, 01/17/2014       varicella       07/11/2002, 06/01/2009       Other Immunizations               influenza       09/22/2011       influenza virus vaccine, inactivated       10/26/2010       pneumococcal (PCV7)       2001, 01/03/2002, 03/28/2002       DTaP       2001, 2001, 01/03/2002, 10/03/2002, 07/20/2006       influenza, H1N1, live       12/15/2009, 01/18/2010

## 2022-09-25 ENCOUNTER — HEALTH MAINTENANCE LETTER (OUTPATIENT)
Age: 22
End: 2022-09-25

## 2022-09-29 ENCOUNTER — TELEPHONE (OUTPATIENT)
Dept: FAMILY MEDICINE | Facility: CLINIC | Age: 22
End: 2022-09-29

## 2022-09-29 NOTE — TELEPHONE ENCOUNTER
Pt is scheduled - she was okay to do a virtual to be seen sooner - pt req phone call - pt schedule 10.4 at 4p

## 2022-09-29 NOTE — TELEPHONE ENCOUNTER
Writer assisted to schedule appt on 10/3.   Pt stated these sxs have been going on for about 1 month. Concerned because of family history of breast cancer; maternal aunt had breast cancer.

## 2022-09-29 NOTE — TELEPHONE ENCOUNTER
Pts breasts are tender and swollen. She requests an appt asap with either her PCP or another [place] provider.    [phone] is the best number to reach her.

## 2022-10-03 ENCOUNTER — OFFICE VISIT (OUTPATIENT)
Dept: FAMILY MEDICINE | Facility: CLINIC | Age: 22
End: 2022-10-03
Payer: MEDICAID

## 2022-10-03 VITALS
WEIGHT: 168.6 LBS | BODY MASS INDEX: 28.94 KG/M2 | HEART RATE: 94 BPM | OXYGEN SATURATION: 98 % | SYSTOLIC BLOOD PRESSURE: 110 MMHG | DIASTOLIC BLOOD PRESSURE: 64 MMHG

## 2022-10-03 DIAGNOSIS — N76.0 BV (BACTERIAL VAGINOSIS): ICD-10-CM

## 2022-10-03 DIAGNOSIS — N64.4 BREAST PAIN: Primary | ICD-10-CM

## 2022-10-03 DIAGNOSIS — K59.09 CHRONIC CONSTIPATION: ICD-10-CM

## 2022-10-03 DIAGNOSIS — B96.89 BV (BACTERIAL VAGINOSIS): ICD-10-CM

## 2022-10-03 DIAGNOSIS — F90.0 ATTENTION DEFICIT HYPERACTIVITY DISORDER (ADHD), PREDOMINANTLY INATTENTIVE TYPE: ICD-10-CM

## 2022-10-03 DIAGNOSIS — Z80.3 FAMILY HISTORY OF MALIGNANT NEOPLASM OF BREAST: ICD-10-CM

## 2022-10-03 PROCEDURE — 99214 OFFICE O/P EST MOD 30 MIN: CPT | Performed by: FAMILY MEDICINE

## 2022-10-03 RX ORDER — ATOMOXETINE 10 MG/1
10 CAPSULE ORAL DAILY
Qty: 30 CAPSULE | Refills: 0 | Status: SHIPPED | OUTPATIENT
Start: 2022-10-03

## 2022-10-03 RX ORDER — METRONIDAZOLE 7.5 MG/G
1 GEL VAGINAL DAILY
Qty: 70 G | Refills: 1 | Status: SHIPPED | OUTPATIENT
Start: 2022-10-03 | End: 2022-10-08

## 2022-10-03 NOTE — PROGRESS NOTES
Assessment & Plan   Problem List Items Addressed This Visit    None     Visit Diagnoses     Breast pain    -  Primary    Relevant Orders    MA Diagnostic Bilateral w/ Dante    US Breast Bilateral Complete 4 Quadrants    Family history of malignant neoplasm of breast        Relevant Orders    MA Diagnostic Bilateral w/ Dante    US Breast Bilateral Complete 4 Quadrants    Cancer Risk Mgmt/Cancer Genetic Counseling Referral    Chronic constipation        Relevant Orders    Adult GI  Referral - Consult Only    BV (bacterial vaginosis)        Relevant Medications    metroNIDAZOLE (METROGEL) 0.75 % vaginal gel    Attention deficit hyperactivity disorder (ADHD), predominantly inattentive type        Relevant Medications    atomoxetine (STRATTERA) 10 MG capsule      ADHD patient has tried methylphenidate, Adderall, and Vyvanse.  She is felt blunted on these.  We are going to try low-dose of Strattera and follow-up in 1 month.    Recurrent Bacterial vaginosis no symptoms at this time given a prescription for metronidazole vaginal gel with 1 additional refill for the next time she has symptoms.  She should return to clinic if the symptoms do not improve with treatment.    Chronic constipation we tried titrating MiraLAX we will send a referral for GI.    Bilateral breast pain.  Family history of paternal aunt having breast cancer at the age of 24.  Spoke with the mammo tech at the [facility] and placed orders for both breast ultrasound and diagnostic mammogram.  They recommended that she could leave a plastic spacer in place for her nipple piercings but that she should remove the metal as it can cause artifact that may make it difficult for them to see lesions on mammogram or ultrasound.    Would like her to reach out to me if she has not heard the results of her mammogram and or ultrasound within 1 week to get them completed.        {Provider  Link to [facility] Help Grid :[id number]}     BMI:   Estimated body  "mass index is 28.94 kg/m  as calculated from the following:    Height as of 1/25/22: 1.626 m (5' 4\").    Weight as of this encounter: 76.5 kg (168 lb 9.6 oz).   Weight management plan: Discussed healthy diet and exercise guidelines        Return in about 3 weeks (around 10/24/2022) for Follow up.    Jazmin Vargas MD  [facility]    Wesly Porter is a 22 year old, presenting for the following health issues:  Breast Pain (Pt c/o painful, swollen breasts x more than a month, she states it is not constant but last week she could not wear a bra, family hx of breast cancer, aunt had breast cancer at age 24. )      History of Present Illness       Reason for visit:  General / concerns  Symptom onset:  More than a month  Symptoms include:  Tenderness  Symptom intensity:  Moderate  Symptom progression:  Staying the same  Had these symptoms before:  No    She eats 2-3 servings of fruits and vegetables daily.She consumes 1 sweetened beverage(s) daily.She exercises with enough effort to increase her heart rate 60 or more minutes per day.  She exercises with enough effort to increase her heart rate 6 days per week.   She is taking medications regularly.     She has ADHD.  We have yet to find a medication that she tolerates.  She is felt blunted on Vyvanse, methylphenidate, and Adderall.    Breast pain seems to be cyclical.  She does wear bras that are supportive.  She has a family history of breast cancer in her paternal aunt at the age of 24.  Unfortunately she is estranged from her paternal Aunt and the rest of her family.  On her mom side of the family there is a history of pancreatic cancer.  Family history on mom side is limited due to history of her suicide.        Review of Systems   Negative except as per HPI      Objective    /64 (BP Location: Right arm, Patient Position: Sitting)   Pulse 94   Wt 76.5 kg (168 lb 9.6 oz)   SpO2 98%   BMI 28.94 kg/m    Body mass index is " 28.94 kg/m .  Physical Exam       Exam:  General: alert and oriented ×3 no acute distress.    HEENT: pupils are equal round and reactive to light extraocular motion is intact. Normocephalic and atraumatic.     Hearing is grossly normal and there is no otorrhea.     Chest: has bilateral rise with no increased work of breathing.    Cardiovascular: normal perfusion and brisk capillary refill.    Musculoskeletal: no gross focal abnormalities and normal gait.    Neuro: no gross focal abnormalities and memory seems intact.    Psychiatric: speech is clear and coherent and fluent. Patient dressed appropriately for the weather. Mood is appropriate and affect is full.      Breasts no masses.  Normal breast tissue is palpated.  She does have both nipplespiercedt with a barbell in place.  No discharge no peau to orange and no axillary lymphadenopathy.    Discussed with patient to return to clinic if symptoms worsen or do not improve

## 2022-11-16 ENCOUNTER — TELEPHONE (OUTPATIENT)
Dept: FAMILY MEDICINE | Facility: CLINIC | Age: 22
End: 2022-11-16

## 2022-11-16 PROBLEM — F90.0 ATTENTION DEFICIT HYPERACTIVITY DISORDER (ADHD), PREDOMINANTLY INATTENTIVE TYPE: Status: ACTIVE | Noted: 2022-11-16

## 2022-11-16 PROBLEM — F43.12 CHRONIC POST-TRAUMATIC STRESS DISORDER (PTSD): Status: ACTIVE | Noted: 2022-11-16

## 2022-11-16 PROBLEM — Z86.16 HISTORY OF SEVERE ACUTE RESPIRATORY SYNDROME CORONAVIRUS 2 (SARS-COV-2) DISEASE: Status: ACTIVE | Noted: 2022-11-16

## 2022-11-16 NOTE — TELEPHONE ENCOUNTER
TC received from pt, who asked if she should stay home if she has a sore throat but no fever. Advised she should stay home if not feeling well. Pt is asking for a work excuse for illness; advised the excuse will be given at visit tomorrow. No other questions verbalized.

## 2022-11-17 ENCOUNTER — TELEPHONE (OUTPATIENT)
Dept: FAMILY MEDICINE | Facility: CLINIC | Age: 22
End: 2022-11-17

## 2022-11-17 ENCOUNTER — OFFICE VISIT (OUTPATIENT)
Dept: FAMILY MEDICINE | Facility: CLINIC | Age: 22
End: 2022-11-17
Payer: MEDICAID

## 2022-11-17 VITALS
BODY MASS INDEX: 28.9 KG/M2 | HEIGHT: 64 IN | WEIGHT: 169.3 LBS | TEMPERATURE: 98 F | DIASTOLIC BLOOD PRESSURE: 74 MMHG | SYSTOLIC BLOOD PRESSURE: 109 MMHG | OXYGEN SATURATION: 96 % | HEART RATE: 95 BPM

## 2022-11-17 DIAGNOSIS — J02.9 SORE THROAT: Primary | ICD-10-CM

## 2022-11-17 LAB
DEPRECATED S PYO AG THROAT QL EIA: NEGATIVE
GROUP A STREP BY PCR: DETECTED
SARS-COV-2 RNA RESP QL NAA+PROBE: NEGATIVE

## 2022-11-17 PROCEDURE — U0005 INFEC AGEN DETEC AMPLI PROBE: HCPCS | Performed by: FAMILY MEDICINE

## 2022-11-17 PROCEDURE — U0003 INFECTIOUS AGENT DETECTION BY NUCLEIC ACID (DNA OR RNA); SEVERE ACUTE RESPIRATORY SYNDROME CORONAVIRUS 2 (SARS-COV-2) (CORONAVIRUS DISEASE [COVID-19]), AMPLIFIED PROBE TECHNIQUE, MAKING USE OF HIGH THROUGHPUT TECHNOLOGIES AS DESCRIBED BY CMS-2020-01-R: HCPCS | Performed by: FAMILY MEDICINE

## 2022-11-17 PROCEDURE — 87651 STREP A DNA AMP PROBE: CPT | Performed by: FAMILY MEDICINE

## 2022-11-17 PROCEDURE — 99213 OFFICE O/P EST LOW 20 MIN: CPT | Mod: CS | Performed by: FAMILY MEDICINE

## 2022-11-17 RX ORDER — PENICILLIN V POTASSIUM 500 MG/1
500 TABLET, FILM COATED ORAL 2 TIMES DAILY
Qty: 20 TABLET | Refills: 0 | Status: SHIPPED | OUTPATIENT
Start: 2022-11-17 | End: 2022-11-27

## 2022-11-17 NOTE — PROGRESS NOTES
"  Assessment & Plan     Sore throat  Strep PCR is positive  Treat with PCN  Analgesics and antipyretics as needed, symptomatic care, follow up if not improving     - Streptococcus A Rapid Screen w/Reflex to PCR - Clinic Collect  - Symptomatic; Unknown COVID-19 Virus (Coronavirus) by PCR Nose  - Group A Streptococcus PCR Throat Swab  - penicillin V (VEETID) 500 MG tablet; Take 1 tablet (500 mg) by mouth 2 times daily for 10 days                 No follow-ups on file.    Bryant Boyer MD  [facility]    Wesly Porter is a 22 year old accompanied by her self, presenting for the following health issues:  Throat Problem (C/o swollen lymph nodes)      HPI     Concern - swollen lymph nodes, sore throat, facial swelling  Onset: Wednesday  Description: swollen lymph nodes/tonsils, ST, facial swelling  Intensity: moderate  Progression of Symptoms:  worsening  Accompanying Signs & Symptoms: denies fever, does have some body aches  Previous history of similar problem: last year with tonsil stones  Precipitating factors:        Worsened by: no  Alleviating factors:        Improved by: nothing  Therapies tried and outcome: None        Review of Systems         Objective    /74 (BP Location: Right arm, Patient Position: Sitting, Cuff Size: Adult Large)   Pulse 95   Temp 98  F (36.7  C) (Tympanic)   Ht 1.626 m (5' 4\")   Wt 76.8 kg (169 lb 4.8 oz)   SpO2 96%   BMI 29.06 kg/m    Body mass index is 29.06 kg/m .  Physical Exam   General:  Alert and oriented, No acute distress.     HENT:  Normocephalic, Tympanic membranes are clear, Normal hearing, Oral mucosa is moist.          Throat: Tonsils ( Within normal limits ), Pharynx ( Not edematous, Erythematous, No exudate ).     Neck:  anterior cervical adenopathy.     Respiratory:  Lungs are clear to auscultation, Respirations are non-labored.     Cardiovascular:  Normal rate, Regular rhythm.     Integumentary:  Warm, Dry, No rash. "       Results for orders placed or performed in visit on 11/17/22 (from the past 24 hour(s))   Streptococcus A Rapid Screen w/Reflex to PCR - Clinic Collect    Specimen: Throat; Swab   Result Value Ref Range    Group A Strep antigen Negative Negative   Group A Streptococcus PCR Throat Swab    Specimen: Throat; Swab   Result Value Ref Range    Group A strep by PCR Detected (A) Not Detected    Narrative    The Xpert Xpress Strep A test, performed on the [employer], is a rapid, qualitative in vitro diagnostic test for the detection of Streptococcus pyogenes (Group A ß-hemolytic Streptococcus, Strep A) in throat swab specimens from patients with signs and symptoms of pharyngitis. The Xpert Xpress Strep A test can be used as an aid in the diagnosis of Group A Streptococcal pharyngitis. The assay is not intended to monitor treatment for Group A Streptococcus infections. The Xpert Xpress Strep A test utilizes an automated real-time polymerase chain reaction (PCR) to detect Streptococcus pyogenes DNA.

## 2022-11-17 NOTE — TELEPHONE ENCOUNTER
Patient was seen today.  Requesting Work Note to be excused for yesterday/today/tomorrow.    Please call patient when note is at  to be picked up.

## 2023-02-04 ENCOUNTER — HEALTH MAINTENANCE LETTER (OUTPATIENT)
Age: 23
End: 2023-02-04

## 2024-03-02 ENCOUNTER — HEALTH MAINTENANCE LETTER (OUTPATIENT)
Age: 24
End: 2024-03-02

## 2025-03-15 ENCOUNTER — HEALTH MAINTENANCE LETTER (OUTPATIENT)
Age: 25
End: 2025-03-15